# Patient Record
Sex: MALE | Race: WHITE | Employment: OTHER | URBAN - METROPOLITAN AREA
[De-identification: names, ages, dates, MRNs, and addresses within clinical notes are randomized per-mention and may not be internally consistent; named-entity substitution may affect disease eponyms.]

---

## 2017-05-22 LAB
AST SERPL-CCNC: 29 U/L (ref 8–48)
CHOLEST SERPL-MCNC: 122 MG/DL
CREAT SERPL-MCNC: 1 MG/DL (ref 0.8–1.3)
GLUCOSE SERPL-MCNC: 92 MG/DL (ref 70–100)
HBA1C MFR BLD: 5.7 % (ref 4–5.6)
HDLC SERPL-MCNC: 47 MG/DL
INR PPP: 1.1 (ref 0.9–1.1)
LDLC SERPL CALC-MCNC: 64 MG/DL
NONHDLC SERPL-MCNC: 75 MG/DL
POTASSIUM SERPL-SCNC: 4.3 MMOL/L (ref 3.6–5.2)
TRIGL SERPL-MCNC: 57 MG/DL
TSH SERPL-ACNC: 0.9 MIU/L (ref 0.3–4.2)

## 2017-05-23 ENCOUNTER — TRANSFERRED RECORDS (OUTPATIENT)
Dept: HEALTH INFORMATION MANAGEMENT | Facility: CLINIC | Age: 82
End: 2017-05-23

## 2017-06-12 ENCOUNTER — OFFICE VISIT (OUTPATIENT)
Dept: FAMILY MEDICINE | Facility: CLINIC | Age: 82
End: 2017-06-12
Payer: MEDICARE

## 2017-06-12 VITALS
WEIGHT: 175.4 LBS | TEMPERATURE: 98.3 F | OXYGEN SATURATION: 100 % | HEART RATE: 83 BPM | DIASTOLIC BLOOD PRESSURE: 68 MMHG | SYSTOLIC BLOOD PRESSURE: 106 MMHG

## 2017-06-12 DIAGNOSIS — R09.81 CONGESTION OF PARANASAL SINUS: Primary | ICD-10-CM

## 2017-06-12 PROCEDURE — 99202 OFFICE O/P NEW SF 15 MIN: CPT | Performed by: FAMILY MEDICINE

## 2017-06-12 RX ORDER — MULTIVIT WITH MINERALS/LUTEIN
1 TABLET ORAL DAILY
COMMUNITY

## 2017-06-12 RX ORDER — TAMSULOSIN HYDROCHLORIDE 0.4 MG/1
0.4 CAPSULE ORAL DAILY
COMMUNITY

## 2017-06-12 RX ORDER — SOTALOL HYDROCHLORIDE 80 MG/1
80 TABLET ORAL 2 TIMES DAILY
COMMUNITY
End: 2019-06-10

## 2017-06-12 RX ORDER — VIT C/E/ZN/COPPR/LUTEIN/ZEAXAN 60 MG-6 MG
1 CAPSULE ORAL DAILY
COMMUNITY
End: 2017-07-13

## 2017-06-12 RX ORDER — AMLODIPINE BESYLATE 2.5 MG/1
2.5 TABLET ORAL 2 TIMES DAILY
COMMUNITY

## 2017-06-12 RX ORDER — CLOPIDOGREL BISULFATE 75 MG/1
75 TABLET ORAL DAILY
COMMUNITY

## 2017-06-12 RX ORDER — MIRABEGRON 50 MG/1
50 TABLET, EXTENDED RELEASE ORAL DAILY
COMMUNITY
End: 2017-08-31

## 2017-06-12 RX ORDER — SULINDAC 200 MG/1
200 TABLET ORAL 2 TIMES DAILY
COMMUNITY

## 2017-06-12 RX ORDER — CALCIUM CARBONATE/VITAMIN D3 500-10/5ML
400 LIQUID (ML) ORAL 2 TIMES DAILY
COMMUNITY
End: 2019-08-13

## 2017-06-12 RX ORDER — GABAPENTIN 300 MG/1
300 CAPSULE ORAL DAILY
COMMUNITY
End: 2017-07-13

## 2017-06-12 NOTE — PROGRESS NOTES
SUBJECTIVE:                                                    Teddy Horner is a 81 year old male who presents to clinic today for the following health issues:      Acute Illness   Acute illness concerns: Sinus Prob  Onset: 2 months    Fever: no     Chills/Sweats: no    Headache (location?): no    Sinus Pressure:YES    Conjunctivitis:  no    Ear Pain: no    Rhinorrhea: YES    Congestion: YES    Sore Throat: no     Cough: no    Wheeze: no     Decreased Appetite: no     Nausea: no    Vomiting: no    Diarrhea:  no    Dysuria/Freq.: no    Fatigue/Achiness: no    Sick/Strep Exposure: no     Therapies Tried and outcome: Afrin, works          Problem list and histories reviewed & adjusted, as indicated.  Additional history: as documented    BP Readings from Last 3 Encounters:   06/12/17 106/68    Wt Readings from Last 3 Encounters:   06/12/17 79.6 kg (175 lb 6.4 oz)                    Reviewed and updated as needed this visit by clinical staff  Tobacco  Allergies  Med Hx  Surg Hx  Fam Hx  Soc Hx      Reviewed and updated as needed this visit by Provider  Tobacco  Allergies  Meds  Med Hx  Surg Hx  Fam Hx  Soc Hx        ROS:  Constitutional, HEENT, cardiovascular, pulmonary, gi and gu systems are negative, except as otherwise noted.    OBJECTIVE:                                                    /68 (BP Location: Right arm, Patient Position: Chair, Cuff Size: Adult Regular)  Pulse 83  Temp 98.3  F (36.8  C) (Oral)  Wt 79.6 kg (175 lb 6.4 oz)  SpO2 100%  There is no height or weight on file to calculate BMI.  GENERAL: healthy, alert and no distress  EYES: Eyes grossly normal to inspection, PERRL and conjunctivae and sclerae normal  HENT: normal cephalic/atraumatic, ear canals and TM's normal, nose and mouth without ulcers or lesions, nasal mucosa edematous , rhinorrhea clear, thick and postnasal, oropharynx clear and oral mucous membranes moist  NECK: no adenopathy, no asymmetry, masses, or scars and  thyroid normal to palpation  RESP: lungs clear to auscultation - no rales, rhonchi or wheezes  CV: regular rate and rhythm, normal S1 S2, no S3 or S4, no murmur, click or rub, no peripheral edema and peripheral pulses strong  MS: no gross musculoskeletal defects noted, no edema         ASSESSMENT/PLAN:                                                        1. Congestion of paranasal sinus  - fluticasone (FLONASE) 50 MCG/ACT spray; Spray 1-2 sprays into both nostrils daily  Dispense: 1 Bottle; Refill: 3    Patient Instructions   Begin flonase nasal spray daily.    Continue for 3-4 weeks after symptoms are resolved.        Whit Casiano MD  [facility]

## 2017-06-12 NOTE — NURSING NOTE
Chief Complaint   Patient presents with     Sinus Problem       Initial /68 (BP Location: Right arm, Patient Position: Chair, Cuff Size: Adult Regular)  Pulse 83  Temp 98.3  F (36.8  C) (Oral)  Wt 79.6 kg (175 lb 6.4 oz)  SpO2 100% There is no height or weight on file to calculate BMI.  Medication Reconciliation: complete       Tana Mann, CMA

## 2017-06-12 NOTE — MR AVS SNAPSHOT
"              After Visit Summary   6/12/2017    Teddy Horner    MRN: [id number]           Patient Information     Date Of Birth          1935        Visit Information        Provider Department      6/12/2017 12:00 PM Whit Casiano MD [facility]        Today's Diagnoses     Congestion of paranasal sinus    -  1      Care Instructions    Begin flonase nasal spray daily.    Continue for 3-4 weeks after symptoms are resolved.            Follow-ups after your visit        Follow-up notes from your care team     Return if symptoms worsen or fail to improve.      Who to contact     If you have questions or need follow up information about today's clinic visit or your schedule please contact [facility] directly at [phone].  Normal or non-critical lab and imaging results will be communicated to you by MyChart, letter or phone within 4 business days after the clinic has received the results. If you do not hear from us within 7 days, please contact the clinic through MyChart or phone. If you have a critical or abnormal lab result, we will notify you by phone as soon as possible.  Submit refill requests through [employer] or call your pharmacy and they will forward the refill request to us. Please allow 3 business days for your refill to be completed.          Additional Information About Your Visit        MyChart Information     [employer] lets you send messages to your doctor, view your test results, renew your prescriptions, schedule appointments and more. To sign up, go to [email or web address] . Click on \"Log in\" on the left side of the screen, which will take you to the Welcome page. Then click on \"Sign up Now\" on the right side of the page.     You will be asked to enter the access code listed below, as well as some personal information. Please follow the directions to create your username and password.     Your access code is: [email or web address]  Expires: 9/15/2017 10:51 PM   "   Your access code will  in 90 days. If you need help or a new code, please call your [facility] or [phone].        Care EveryWhere ID     This is your Care EveryWhere ID. This could be used by other organizations to access your [facility] medical records  [id number]        Your Vitals Were     Pulse Temperature Pulse Oximetry             83 98.3  F (36.8  C) (Oral) 100%          Blood Pressure from Last 3 Encounters:   17 106/68    Weight from Last 3 Encounters:   17 79.6 kg (175 lb 6.4 oz)              Today, you had the following     No orders found for display       Primary Care Provider    None Specified       No primary provider on file.        Thank you!     Thank you for choosing [facility]  for your care. Our goal is always to provide you with excellent care. Hearing back from our patients is one way we can continue to improve our services. Please take a few minutes to complete the written survey that you may receive in the mail after your visit with us. Thank you!             Your Updated Medication List - Protect others around you: Learn how to safely use, store and throw away your medicines at www.disposemymeds.org.          This list is accurate as of: 17 11:59 PM.  Always use your most recent med list.                   Brand Name Dispense Instructions for use    amLODIPine 2.5 MG tablet    NORVASC     Take 2.5 mg by mouth 2 times daily       aspirin 81 MG tablet      Take 81 mg by mouth daily       atorvastatin 10 MG tablet    LIPITOR    30 tablet    Take 0.5 tablets (5 mg) by mouth daily       BETAPACE 80 MG tablet   Generic drug:  sotalol      Take 80 mg by mouth 2 times daily       * CENTRUM SILVER per tablet      Take 1 tablet by mouth daily       * multivitamin  with lutein Caps per capsule      Take 1 capsule by mouth daily       * OCUVITE PO          FLOMAX 0.4 MG capsule   Generic drug:  tamsulosin      Take 0.4 mg by mouth daily        fluticasone 50 MCG/ACT spray    FLONASE    1 Bottle    Spray 1-2 sprays into both nostrils daily       levothyroxine 100 MCG tablet    SYNTHROID/LEVOTHROID    90 tablet    Take 1 tablet (100 mcg) by mouth daily       magnesium oxide 400 MG Caps      Take 400 mg by mouth 2 times daily       mirabegron 50 MG 24 hr tablet    MYRBETRIQ     Take 50 mg by mouth daily       NEURONTIN 300 MG capsule   Generic drug:  gabapentin      Take 300 mg by mouth daily       PANTOPRAZOLE SODIUM PO      Take 40 mg by mouth every evening       PLAVIX 75 MG tablet   Generic drug:  clopidogrel      Take 75 mg by mouth daily       riboflavin 100 MG Caps      Take 100 mg by mouth daily       sulindac 200 MG tablet    CLINORIL     Take 200 mg by mouth 2 times daily       * Notice:  This list has 3 medication(s) that are the same as other medications prescribed for you. Read the directions carefully, and ask your doctor or other care provider to review them with you.

## 2017-06-17 PROBLEM — G43.109 OCULAR MIGRAINE: Status: ACTIVE | Noted: 2017-06-17

## 2017-06-17 PROBLEM — I25.810 CORONARY ARTERY DISEASE INVOLVING CORONARY BYPASS GRAFT OF NATIVE HEART WITHOUT ANGINA PECTORIS: Status: ACTIVE | Noted: 2017-06-17

## 2017-06-17 PROBLEM — Z86.73 HISTORY OF TIA (TRANSIENT ISCHEMIC ATTACK) AND STROKE: Status: ACTIVE | Noted: 2017-06-17

## 2017-06-17 PROBLEM — E03.9 ACQUIRED HYPOTHYROIDISM: Status: ACTIVE | Noted: 2017-06-17

## 2017-06-17 RX ORDER — LEVOTHYROXINE SODIUM 100 UG/1
100 TABLET ORAL DAILY
Qty: 90 TABLET | Refills: 1 | COMMUNITY
Start: 2017-06-17

## 2017-06-17 RX ORDER — ATORVASTATIN CALCIUM 10 MG/1
5 TABLET, FILM COATED ORAL DAILY
Qty: 30 TABLET | Refills: 1 | COMMUNITY
Start: 2017-06-17

## 2017-06-17 RX ORDER — FLUTICASONE PROPIONATE 50 MCG
1-2 SPRAY, SUSPENSION (ML) NASAL DAILY
Qty: 1 BOTTLE | Refills: 3 | COMMUNITY
Start: 2017-06-17 | End: 2017-11-28

## 2017-07-13 ENCOUNTER — OFFICE VISIT (OUTPATIENT)
Dept: FAMILY MEDICINE | Facility: CLINIC | Age: 82
End: 2017-07-13
Payer: MEDICARE

## 2017-07-13 ENCOUNTER — RADIANT APPOINTMENT (OUTPATIENT)
Dept: GENERAL RADIOLOGY | Facility: CLINIC | Age: 82
End: 2017-07-13
Attending: FAMILY MEDICINE
Payer: MEDICARE

## 2017-07-13 VITALS
HEART RATE: 70 BPM | WEIGHT: 176.5 LBS | TEMPERATURE: 97.7 F | OXYGEN SATURATION: 98 % | DIASTOLIC BLOOD PRESSURE: 82 MMHG | SYSTOLIC BLOOD PRESSURE: 128 MMHG

## 2017-07-13 DIAGNOSIS — I25.810 CORONARY ARTERY DISEASE INVOLVING CORONARY BYPASS GRAFT OF NATIVE HEART WITHOUT ANGINA PECTORIS: ICD-10-CM

## 2017-07-13 DIAGNOSIS — M79.2 NEUROPATHIC PAIN, ARM: ICD-10-CM

## 2017-07-13 DIAGNOSIS — M50.30 DDD (DEGENERATIVE DISC DISEASE), CERVICAL: ICD-10-CM

## 2017-07-13 DIAGNOSIS — R20.2 BILATERAL ARM NUMBNESS AND TINGLING WHILE SLEEPING: Primary | ICD-10-CM

## 2017-07-13 DIAGNOSIS — R20.0 BILATERAL ARM NUMBNESS AND TINGLING WHILE SLEEPING: Primary | ICD-10-CM

## 2017-07-13 DIAGNOSIS — R20.2 BILATERAL ARM NUMBNESS AND TINGLING WHILE SLEEPING: ICD-10-CM

## 2017-07-13 DIAGNOSIS — J01.90 ACUTE SINUSITIS WITH SYMPTOMS > 10 DAYS: ICD-10-CM

## 2017-07-13 DIAGNOSIS — R20.0 BILATERAL ARM NUMBNESS AND TINGLING WHILE SLEEPING: ICD-10-CM

## 2017-07-13 PROCEDURE — 99215 OFFICE O/P EST HI 40 MIN: CPT | Performed by: FAMILY MEDICINE

## 2017-07-13 PROCEDURE — 72040 X-RAY EXAM NECK SPINE 2-3 VW: CPT

## 2017-07-13 RX ORDER — AMLODIPINE BESYLATE 2.5 MG/1
2.5 TABLET ORAL 2 TIMES DAILY
Qty: 30 TABLET | Status: CANCELLED | OUTPATIENT
Start: 2017-07-13

## 2017-07-13 RX ORDER — GABAPENTIN 100 MG/1
100 CAPSULE ORAL AT BEDTIME
Qty: 30 CAPSULE | Refills: 0 | Status: SHIPPED | OUTPATIENT
Start: 2017-07-13 | End: 2019-06-10

## 2017-07-13 RX ORDER — AMOXICILLIN 875 MG
875 TABLET ORAL 2 TIMES DAILY
Qty: 20 TABLET | Refills: 0 | Status: SHIPPED | OUTPATIENT
Start: 2017-07-13 | End: 2017-08-31

## 2017-07-13 NOTE — NURSING NOTE
Chief Complaint   Patient presents with     Tingling     bilateral hands       Initial /82 (BP Location: Left arm, Patient Position: Sitting, Cuff Size: Adult Regular)  Pulse 70  Temp 97.7  F (36.5  C) (Oral)  Wt 80.1 kg (176 lb 8 oz)  SpO2 98% There is no height or weight on file to calculate BMI.  Medication Reconciliation: complete       Tana Mann, CMA

## 2017-07-13 NOTE — PROGRESS NOTES
"  SUBJECTIVE:                                                    Teddy Horner is a 81 year old male who presents to clinic today for the following health issues:      Hand tingling    Onset: 3 weeks    Description:   Location: bilateral hands  Character: \"pins and needle\" feeling    Intensity: severity comes and goes    Progression of Symptoms: same    Accompanying Signs & Symptoms:  Other symptoms: radiation up arm to wrist, sometimes to the shoulder and tingling varies in intensity    History:   Previous similar pain: no       Precipitating factors:   Trauma or overuse: no     Alleviating factors:  Improved by: nothing    Therapies Tried and outcome: None  Initially more sporadic, now relatively constant at night.  Varies position at night.          RESPIRATORY SYMPTOMS      Duration: 2-3 months    Description  nasal congestion, rhinorrhea, facial pain/pressure, cough and headache    Severity: moderate    Accompanying signs and symptoms: None    History (predisposing factors):  none    Precipitating or alleviating factors: None    Therapies tried and outcome:  rest and fluids antihistamine guaifenesin    Vascular Disease Follow-up:  Coronary Artery Disease (CAD)      Chest pain or pressure, left side neck or arm pain: No    Shortness of breath/increased sweats/nausea with exertion: No    Pain in calves walking 1-2 blocks: No    Worsened or new symptoms since last visit: No    Nitroglycerin use: no    Daily aspirin use: Yes      Problem list and histories reviewed & adjusted, as indicated.  Additional history: as documented    BP Readings from Last 3 Encounters:   07/13/17 128/82   06/12/17 106/68    Wt Readings from Last 3 Encounters:   07/13/17 80.1 kg (176 lb 8 oz)   06/12/17 79.6 kg (175 lb 6.4 oz)                    Reviewed and updated as needed this visit by clinical staff  Tobacco  Allergies  Meds  Med Hx  Surg Hx  Fam Hx  Soc Hx      Reviewed and updated as needed this visit by Provider  Tobacco  " Allergies  Meds  Med Hx  Surg Hx  Fam Hx  Soc Hx        ROS:  Constitutional, HEENT, cardiovascular, pulmonary, gi and gu systems are negative, except as otherwise noted.    OBJECTIVE:     /82 (BP Location: Left arm, Patient Position: Sitting, Cuff Size: Adult Regular)  Pulse 70  Temp 97.7  F (36.5  C) (Oral)  Wt 80.1 kg (176 lb 8 oz)  SpO2 98%  There is no height or weight on file to calculate BMI.  GENERAL: alert and no distress  HENT: normal cephalic/atraumatic, ear canals and TM's normal, nose and mouth without ulcers or lesions, nasal mucosa edematous , rhinorrhea yellow, green, thick and postnasal, oropharynx clear, oral mucous membranes moist and sinuses: maxillary, frontal tenderness on both sides  NECK: no adenopathy, no asymmetry, masses, or scars and thyroid normal to palpation  RESP: lungs clear to auscultation - no rales, rhonchi or wheezes  CV: regular rate and rhythm, normal S1 S2, no S3 or S4, no murmur, click or rub, no peripheral edema and peripheral pulses strong  ABDOMEN: soft, nontender, no hepatosplenomegaly, no masses and bowel sounds normal  MS: extremities normal- no gross deformities noted  NEURO: Normal strength and tone, sensory exam grossly normal, mentation intact, cranial nerves 2-12 intact, DTR's normal and symmetric throughout, gait normal including heel/toe/tandem walking, Romberg normal and complaints of pain positional with numbness/tingling in the arms bilaterally  PSYCH: mentation appears normal, affect normal/bright    Diagnostic Test Results:  Xray - DDD C6-7 with fusion of C5-6 noted.      ASSESSMENT/PLAN:         1. Bilateral arm numbness and tingling while sleeping  Reviewed labs done in May from [facility] - normal TSH, blood sugar. Slightly elevated B12.    Likely related to DDD  - XR Cervical Spine 2/3 Views; Future  - gabapentin (NEURONTIN) 100 MG capsule; Take 1 capsule (100 mg) by mouth At Bedtime  Dispense: 30 capsule; Refill: 0  - MR Cervical Spine w/o  Contrast; Future  - patient prefers to have completed at [place] when he is there.  Order placed and printed.      2. DDD (degenerative disc disease), cervical  Trial of gabapentin for pain/numbness - adjust dose as needed.  Has taken medication in past prior to previous neck surgery.   - gabapentin (NEURONTIN) 100 MG capsule; Take 1 capsule (100 mg) by mouth At Bedtime  Dispense: 30 capsule; Refill: 0  - MR Cervical Spine w/o Contrast; Future    3. Neuropathic pain, arm  - gabapentin (NEURONTIN) 100 MG capsule; Take 1 capsule (100 mg) by mouth At Bedtime  Dispense: 30 capsule; Refill: 0  - MR Cervical Spine w/o Contrast; Future    4. Acute sinusitis with symptoms > 10 days  Symptomatic care as described.   - amoxicillin (AMOXIL) 875 MG tablet; Take 1 tablet (875 mg) by mouth 2 times daily  Dispense: 20 tablet; Refill: 0    5. Coronary artery disease involving coronary bypass graft of native heart without angina pectoris  Controlled.  Follow up with [facility] next month as planned.  Discrepancy in dosing between pharmacy and [facility] sites.  Patient instructed to verify dosing with primary cardiology and notify me if refill needed.    Patient Instructions   I would recommend you verify with your primary cardiologist how they want you to take the Amlodipine since the dosing is different on the records from what the pharmacy reports.      Xray indicates degenerative disc disease for C5-6, C6-7.  I would recommend MRI of the neck for evaluation because the arm symptoms are likely related.  I would recommend you add Neurontin 100 mg at night for the arm pain.  If helpful but continued symptoms we can adjust the dose.      Begin amoxicillin.  Continue the flonase.      Whit Casiano MD  [facility]        Total time:  45 min,  Counseling time:  Greater than 50% of total time with reference to the above noted symptoms.

## 2017-07-13 NOTE — MR AVS SNAPSHOT
After Visit Summary   7/13/2017    Teddy Horner    MRN: [id number]           Patient Information     Date Of Birth          1935        Visit Information        Provider Department      7/13/2017 1:40 PM Whit Casiano MD [facility]        Today's Diagnoses     Bilateral arm numbness and tingling while sleeping    -  1    Acute sinusitis with symptoms > 10 days        DDD (degenerative disc disease), cervical        Neuropathic pain, arm          Care Instructions    I would recommend you verify with your primary cardiologist how they want you to take the Amlodipine since the dosing is different on the records from what the pharmacy reports.      Xray indicates degenerative disc disease for C5-6, C6-7.  I would recommend MRI of the neck for evaluation because the arm symptoms are likely related.  I would recommend you add Neurontin 100 mg at night for the arm pain.  If helpful but continued symptoms we can adjust the dose.      Begin amoxicillin.  Continue the flonase.          Follow-ups after your visit        Future tests that were ordered for you today     Open Future Orders        Priority Expected Expires Ordered    MR Cervical Spine w/o Contrast Routine  7/13/2018 7/13/2017            Who to contact     If you have questions or need follow up information about today's clinic visit or your schedule please contact [facility] directly at [phone].  Normal or non-critical lab and imaging results will be communicated to you by MyChart, letter or phone within 4 business days after the clinic has received the results. If you do not hear from us within 7 days, please contact the clinic through MyChart or phone. If you have a critical or abnormal lab result, we will notify you by phone as soon as possible.  Submit refill requests through [email or web address] or call your pharmacy and they will forward the refill request to us. Please allow 3 business days for your refill  "to be completed.          Additional Information About Your Visit        [employer] Information     [employer] lets you send messages to your doctor, view your test results, renew your prescriptions, schedule appointments and more. To sign up, go to [email or web address] . Click on \"Log in\" on the left side of the screen, which will take you to the Welcome page. Then click on \"Sign up Now\" on the right side of the page.     You will be asked to enter the access code listed below, as well as some personal information. Please follow the directions to create your username and password.     Your access code is: [email or web address]  Expires: 9/15/2017 10:51 PM     Your access code will  in 90 days. If you need help or a new code, please call your [facility] clinic or [phone].        Care EveryWhere ID     This is your Care EveryWhere ID. This could be used by other organizations to access your [facility] medical records  [id number]        Your Vitals Were     Pulse Temperature Pulse Oximetry             70 97.7  F (36.5  C) (Oral) 98%          Blood Pressure from Last 3 Encounters:   17 128/82   17 106/68    Weight from Last 3 Encounters:   17 80.1 kg (176 lb 8 oz)   17 79.6 kg (175 lb 6.4 oz)                 Today's Medication Changes          These changes are accurate as of: 17  3:43 PM.  If you have any questions, ask your nurse or doctor.               Start taking these medicines.        Dose/Directions    amoxicillin 875 MG tablet   Commonly known as:  AMOXIL   Used for:  Acute sinusitis with symptoms > 10 days   Started by:  Whit Casiano MD        Dose:  875 mg   Take 1 tablet (875 mg) by mouth 2 times daily   Quantity:  20 tablet   Refills:  0       gabapentin 100 MG capsule   Commonly known as:  NEURONTIN   Used for:  Bilateral arm numbness and tingling while sleeping, DDD (degenerative disc disease), cervical, Neuropathic pain, arm   Started by:  Whit Casiano MD        " Dose:  100 mg   Take 1 capsule (100 mg) by mouth At Bedtime   Quantity:  30 capsule   Refills:  0            Where to get your medicines      These medications were sent to [facility] [address] [facility] [address] [place] [address]  [address] [address] [address]     Phone:  [phone]     amoxicillin 875 MG tablet    gabapentin 100 MG capsule                Primary Care Provider    None Specified       No primary provider on file.        Equal Access to Services     [facility] : Hadii emely tim hadasho Soomaali, waaxda luqadaha, qaybta kaalmada adeegyada, waxay kurtin haybozena roach . So [facility] [phone].    ATENCIÓN: Si habla espdania, tiene a knutson disposición servicios gratuitos de asistencia lingüística. Victoriano al [phone].    We comply with applicable federal civil rights laws and [place] laws. We do not discriminate on the basis of race, color, national origin, age, disability sex, sexual orientation or gender identity.            Thank you!     Thank you for choosing [facility]  for your care. Our goal is always to provide you with excellent care. Hearing back from our patients is one way we can continue to improve our services. Please take a few minutes to complete the written survey that you may receive in the mail after your visit with us. Thank you!             Your Updated Medication List - Protect others around you: Learn how to safely use, store and throw away your medicines at www.disposemymeds.org.          This list is accurate as of: 7/13/17  3:43 PM.  Always use your most recent med list.                   Brand Name Dispense Instructions for use Diagnosis    amLODIPine 2.5 MG tablet    NORVASC     Take 2.5 mg by mouth 2 times daily        amoxicillin 875 MG tablet    AMOXIL    20 tablet    Take 1 tablet (875 mg) by mouth 2 times daily    Acute sinusitis with symptoms > 10 days       aspirin 81 MG tablet      Take 81 mg by mouth daily         atorvastatin 10 MG tablet    LIPITOR    30 tablet    Take 0.5 tablets (5 mg) by mouth daily        BETAPACE 80 MG tablet   Generic drug:  sotalol      Take 80 mg by mouth 2 times daily        * CENTRUM SILVER per tablet      Take 1 tablet by mouth daily        * OCUVITE PO           FLOMAX 0.4 MG capsule   Generic drug:  tamsulosin      Take 0.4 mg by mouth daily        fluticasone 50 MCG/ACT spray    FLONASE    1 Bottle    Spray 1-2 sprays into both nostrils daily    Congestion of paranasal sinus       gabapentin 100 MG capsule    NEURONTIN    30 capsule    Take 1 capsule (100 mg) by mouth At Bedtime    Bilateral arm numbness and tingling while sleeping, DDD (degenerative disc disease), cervical, Neuropathic pain, arm       levothyroxine 100 MCG tablet    SYNTHROID/LEVOTHROID    90 tablet    Take 1 tablet (100 mcg) by mouth daily        magnesium oxide 400 MG Caps      Take 400 mg by mouth 2 times daily        mirabegron 50 MG 24 hr tablet    MYRBETRIQ     Take 50 mg by mouth daily        PANTOPRAZOLE SODIUM PO      Take 40 mg by mouth every evening        PLAVIX 75 MG tablet   Generic drug:  clopidogrel      Take 75 mg by mouth daily        sulindac 200 MG tablet    CLINORIL     Take 200 mg by mouth 2 times daily        * Notice:  This list has 2 medication(s) that are the same as other medications prescribed for you. Read the directions carefully, and ask your doctor or other care provider to review them with you.

## 2017-07-13 NOTE — PATIENT INSTRUCTIONS
I would recommend you verify with your primary cardiologist how they want you to take the Amlodipine since the dosing is different on the records from what the pharmacy reports.      Xray indicates degenerative disc disease for C5-6, C6-7.  I would recommend MRI of the neck for evaluation because the arm symptoms are likely related.  I would recommend you add Neurontin 100 mg at night for the arm pain.  If helpful but continued symptoms we can adjust the dose.      Begin amoxicillin.  Continue the flonase.

## 2017-07-13 NOTE — LETTER
[address] [address]  [phone]      July 17, 2017      Teddy Horner  [address]  [facility] [address]      Please print letter and attach results.  PSK         Attached you will find a copy of your recent xray report.   Your final xray reports notes the fusion of the 2 bones we saw in the xray yesterday at your appointment.  This is undoubtedly due to your prior surgery.   The additional arthritis changes are noted.  The MRI would be helpful at evaluating the spinal cord with nerves in the area related to the numbness symptoms you are having.   Please call or MyChart message me if you have any questions.       Sincerely,         Whit Casiano MD

## 2017-07-14 NOTE — PROGRESS NOTES
Please print letter and attach results.  PSK        Attached you will find a copy of your recent xray report.  Your final xray reports notes the fusion of the 2 bones we saw in the xray yesterday at your appointment.  This is undoubtedly due to your prior surgery.  The additional arthritis changes are noted.  The MRI would be helpful at evaluating the spinal cord with nerves in the area related to the numbness symptoms you are having.  Please call or MyChart message me if you have any questions.    Sincerely,         Whit Casiano MD

## 2017-07-14 NOTE — TELEPHONE ENCOUNTER
amLODIPine (NORVASC) 2.5 MG tablet      Last Written Prescription Date:  06/17/17  Last Fill Quantity: n/a,   # refills: n/a  Last Office Visit with [facility], P or [facility] prescribing provider: 7/13/17 Dr. Casiano  Future Office visit:       Routing refill request to provider for review/approval because:  Drug not active on patient's medication list  Medication is reported/historical

## 2017-07-17 NOTE — TELEPHONE ENCOUNTER
"The number below is not active.  Pharmacy has these numbers on file [phone] and [phone].  Pt answered the first number listed.     He is getting amlodipine refilled by his cardiologist.  He was advised for first 5 days to take amlodipine 2.5 mg BID.    Pt said: \"my blood pressure dropped dramatically; in the morning it is around 117/67\"  When asked if he feels dizzy, he said \"No\" but then he said that he is dizzy at all times because of other medications.    Dana Kramer RN    "

## 2017-07-17 NOTE — TELEPHONE ENCOUNTER
Call spouse re:  norvasc dose - just to verify, did they contact his primary cardiologist? - he is taking 2.5 mg in AM and repeating dose in pm only if BP >160/100    PSK

## 2017-07-18 ENCOUNTER — TELEPHONE (OUTPATIENT)
Dept: FAMILY MEDICINE | Facility: CLINIC | Age: 82
End: 2017-07-18

## 2017-07-18 DIAGNOSIS — R20.2 BILATERAL ARM NUMBNESS AND TINGLING WHILE SLEEPING: ICD-10-CM

## 2017-07-18 DIAGNOSIS — M50.30 DDD (DEGENERATIVE DISC DISEASE), CERVICAL: Primary | ICD-10-CM

## 2017-07-18 DIAGNOSIS — M79.2 NEUROPATHIC PAIN, ARM: ICD-10-CM

## 2017-07-18 DIAGNOSIS — R20.0 BILATERAL ARM NUMBNESS AND TINGLING WHILE SLEEPING: ICD-10-CM

## 2017-07-18 NOTE — TELEPHONE ENCOUNTER
"Reason for Call:  Other call back    Detailed comments:  Teddy ross to inform you that per is \"electric\" Doctor he does not recommend that Teddy get and MRI done unless it is out at the [facility].  Normal MRI machines can melt the coils and what not he says.  Teddy does have an MRI scheduled in September for other reasons at the [facility]. Not sure if he would need to get another one per your orders if so could you send the order to [place] for him.       Phone Number Patient can be reached at: Cell number on file:    Telephone Information:   Mobile [phone]       Best Time: Any    Can we leave a detailed message on this number? YES    Call taken on 7/18/2017 at 2:24 PM by Ganga Holder    [facility]   "

## 2017-07-28 ENCOUNTER — TELEPHONE (OUTPATIENT)
Dept: FAMILY MEDICINE | Facility: CLINIC | Age: 82
End: 2017-07-28

## 2017-07-28 NOTE — TELEPHONE ENCOUNTER
Patient contacted.  Will have MRI at [place] on August 14th and them will see spine specialist.  Working to schedule appt with [facility] after MRI is done. This is a FYI.

## 2017-07-28 NOTE — TELEPHONE ENCOUNTER
Reason for Call:  Other appointment    Detailed comments: Pt would like a call alee for he had to cancel his MRI for he has an implant and does not want to destroy the leads in his ICD.  He also has further and has questions regarding his future MRI apts that he would like to discuss further as well.    Phone Number Patient can be reached at: Cell number on file:    Telephone Information:   Mobile [phone]       Best Time: anytime    Can we leave a detailed message on this number? YES    Call taken on 7/28/2017 at 10:01 AM by Nick Krishna

## 2017-07-28 NOTE — TELEPHONE ENCOUNTER
Original plan was for him to have the MRI through [place] at that appointment he already had scheduled.  Timing noted.    HAKEEM

## 2017-08-31 ENCOUNTER — OFFICE VISIT (OUTPATIENT)
Dept: FAMILY MEDICINE | Facility: CLINIC | Age: 82
End: 2017-08-31
Payer: MEDICARE

## 2017-08-31 ENCOUNTER — TELEPHONE (OUTPATIENT)
Dept: FAMILY MEDICINE | Facility: CLINIC | Age: 82
End: 2017-08-31

## 2017-08-31 VITALS
WEIGHT: 182 LBS | HEIGHT: 68 IN | SYSTOLIC BLOOD PRESSURE: 92 MMHG | BODY MASS INDEX: 27.58 KG/M2 | HEART RATE: 81 BPM | TEMPERATURE: 96.9 F | OXYGEN SATURATION: 98 % | DIASTOLIC BLOOD PRESSURE: 64 MMHG

## 2017-08-31 DIAGNOSIS — H01.02A SQUAMOUS BLEPHARITIS OF UPPER AND LOWER EYELIDS OF BOTH EYES: ICD-10-CM

## 2017-08-31 DIAGNOSIS — H01.02B SQUAMOUS BLEPHARITIS OF UPPER AND LOWER EYELIDS OF BOTH EYES: ICD-10-CM

## 2017-08-31 DIAGNOSIS — H10.33 ACUTE BACTERIAL CONJUNCTIVITIS OF BOTH EYES: Primary | ICD-10-CM

## 2017-08-31 PROBLEM — Z71.89 ADVANCED DIRECTIVES, COUNSELING/DISCUSSION: Status: ACTIVE | Noted: 2017-08-31

## 2017-08-31 PROCEDURE — 99213 OFFICE O/P EST LOW 20 MIN: CPT | Performed by: FAMILY MEDICINE

## 2017-08-31 RX ORDER — TOBRAMYCIN 3 MG/ML
1 SOLUTION/ DROPS OPHTHALMIC EVERY 4 HOURS
Qty: 1 BOTTLE | Refills: 0 | Status: SHIPPED | OUTPATIENT
Start: 2017-08-31 | End: 2017-09-11

## 2017-08-31 NOTE — PROGRESS NOTES
"  SUBJECTIVE:                                                    Teddy Horner is a 81 year old male who presents to clinic today for the following health issues:    Eye(s) Problem  Onset: x 1 week    Description:   Location: bilateral  Pain: no   Redness: YES    Accompanying Signs & Symptoms:  Discharge/mattering: YES  L>R  Swelling: YES  Visual changes: no  Fever: no  Nasal Congestion: YES  Bothered by bright lights: no    History:   Trauma: no   Foreign body exposure: no    Precipitating factors:   Wearing contacts: no    Alleviating factors:  Improved by: none    Therapies Tried and outcome: none          Problem list and histories reviewed & adjusted, as indicated.  Additional history: as documented    BP Readings from Last 3 Encounters:   08/31/17 92/64   07/13/17 128/82   06/12/17 106/68    Wt Readings from Last 3 Encounters:   08/31/17 82.6 kg (182 lb)   07/13/17 80.1 kg (176 lb 8 oz)   06/12/17 79.6 kg (175 lb 6.4 oz)                      ROS:  Constitutional, HEENT, cardiovascular, pulmonary, gi and gu systems are negative, except as otherwise noted.      OBJECTIVE:   BP 92/64  Pulse 81  Temp 96.9  F (36.1  C) (Oral)  Ht 1.727 m (5' 8\")  Wt 82.6 kg (182 lb)  SpO2 98%  BMI 27.67 kg/m2  Body mass index is 27.67 kg/(m^2).  GENERAL: alert, no distress and elderly  EYES: eyelids- erythema with scaling noted and conjunctiva/corneas- conjunctival injection OU and yellow colored discharge present bilateral  NECK: no adenopathy, no asymmetry, masses, or scars and thyroid normal to palpation  RESP: lungs clear to auscultation - no rales, rhonchi or wheezes  CV: regular rate and rhythm, normal S1 S2, no S3 or S4, no murmur, click or rub, no peripheral edema and peripheral pulses strong  MS: no gross musculoskeletal defects noted, no edema        ASSESSMENT/PLAN:             1. Acute bacterial conjunctivitis of both eyes  - tobramycin (TOBREX) 0.3 % ophthalmic solution; Apply 1 drop to eye every 4 hours for 7 " days  Dispense: 1 Bottle; Refill: 0    2. Squamous blepharitis of upper and lower eyelids of both eyes  See instructions.      Patient Instructions   Begin eye drops both eyes every 4 hour while awake for 7 days.    Baby shampoo eye washes twice a day recommended.        Whit Casiano MD  [facility]

## 2017-08-31 NOTE — MR AVS SNAPSHOT
"              After Visit Summary   2017    Teddy Horner    MRN: [id number]           Patient Information     Date Of Birth          1935        Visit Information        Provider Department      2017 8:40 AM Whit Casiano MD [facility]        Today's Diagnoses     Acute bacterial conjunctivitis of both eyes    -  1      Care Instructions    Begin eye drops both eyes every 4 hour while awake for 7 days.    Baby shampoo eye washes twice a day recommended.            Follow-ups after your visit        Who to contact     If you have questions or need follow up information about today's clinic visit or your schedule please contact [facility] directly at [phone].  Normal or non-critical lab and imaging results will be communicated to you by MyChart, letter or phone within 4 business days after the clinic has received the results. If you do not hear from us within 7 days, please contact the clinic through MyChart or phone. If you have a critical or abnormal lab result, we will notify you by phone as soon as possible.  Submit refill requests through [employer] or call your pharmacy and they will forward the refill request to us. Please allow 3 business days for your refill to be completed.          Additional Information About Your Visit        MyChart Information     [employer] lets you send messages to your doctor, view your test results, renew your prescriptions, schedule appointments and more. To sign up, go to [email or web address] . Click on \"Log in\" on the left side of the screen, which will take you to the Welcome page. Then click on \"Sign up Now\" on the right side of the page.     You will be asked to enter the access code listed below, as well as some personal information. Please follow the directions to create your username and password.     Your access code is: [email or web address]  Expires: 9/15/2017 10:51 PM     Your access code will  in 90 days. If you need " "help or a new code, please call your [facility] clinic or [phone].        Care EveryWhere ID     This is your Care EveryWhere ID. This could be used by other organizations to access your [facility] medical records  [id number]        Your Vitals Were     Pulse Temperature Height Pulse Oximetry BMI (Body Mass Index)       81 96.9  F (36.1  C) (Oral) 1.727 m (5' 8\") 98% 27.67 kg/m2        Blood Pressure from Last 3 Encounters:   08/31/17 92/64   07/13/17 128/82   06/12/17 106/68    Weight from Last 3 Encounters:   08/31/17 82.6 kg (182 lb)   07/13/17 80.1 kg (176 lb 8 oz)   06/12/17 79.6 kg (175 lb 6.4 oz)              Today, you had the following     No orders found for display         Today's Medication Changes          These changes are accurate as of: 8/31/17  9:14 AM.  If you have any questions, ask your nurse or doctor.               Start taking these medicines.        Dose/Directions    tobramycin 0.3 % ophthalmic solution   Commonly known as:  TOBREX   Used for:  Acute bacterial conjunctivitis of both eyes   Started by:  Whit Casiano MD        Dose:  1 drop   Apply 1 drop to eye every 4 hours for 7 days   Quantity:  1 Bottle   Refills:  0            Where to get your medicines      These medications were sent to [facility] [address] [place] [address] [address]  [address] [facility] [place] [address]     Phone:  [phone]     tobramycin 0.3 % ophthalmic solution                Primary Care Provider    None Specified       No primary provider on file.        Equal Access to Services     [facility] Center[facility]: Hadcharissa Bangura, wakita luqadaha, qaybraman humphreysaltennille mejia. So [facility] [phone].    ATENCIÓN: Si habla español, tiene a knutosn disposición servicios gratuitos de asistencia lingüística. Llame al [phone].    We comply with applicable federal civil rights laws and [place] laws. We do not discriminate on the basis of race, " color, national origin, age, disability sex, sexual orientation or gender identity.            Thank you!     Thank you for choosing [facility]  for your care. Our goal is always to provide you with excellent care. Hearing back from our patients is one way we can continue to improve our services. Please take a few minutes to complete the written survey that you may receive in the mail after your visit with us. Thank you!             Your Updated Medication List - Protect others around you: Learn how to safely use, store and throw away your medicines at www.disposemymeds.org.          This list is accurate as of: 8/31/17  9:14 AM.  Always use your most recent med list.                   Brand Name Dispense Instructions for use Diagnosis    amLODIPine 2.5 MG tablet    NORVASC     Take 2.5 mg by mouth 2 times daily        aspirin 81 MG tablet      Take 81 mg by mouth daily        atorvastatin 10 MG tablet    LIPITOR    30 tablet    Take 0.5 tablets (5 mg) by mouth daily        BETAPACE 80 MG tablet   Generic drug:  sotalol      Take 80 mg by mouth 2 times daily        * CENTRUM SILVER per tablet      Take 1 tablet by mouth daily        * OCUVITE PO           FLOMAX 0.4 MG capsule   Generic drug:  tamsulosin      Take 0.4 mg by mouth daily        fluticasone 50 MCG/ACT spray    FLONASE    1 Bottle    Spray 1-2 sprays into both nostrils daily    Congestion of paranasal sinus       gabapentin 100 MG capsule    NEURONTIN    30 capsule    Take 1 capsule (100 mg) by mouth At Bedtime    Bilateral arm numbness and tingling while sleeping, DDD (degenerative disc disease), cervical, Neuropathic pain, arm       levothyroxine 100 MCG tablet    SYNTHROID/LEVOTHROID    90 tablet    Take 1 tablet (100 mcg) by mouth daily        magnesium oxide 400 MG Caps      Take 400 mg by mouth 2 times daily        PANTOPRAZOLE SODIUM PO      Take 40 mg by mouth every evening        PLAVIX 75 MG tablet   Generic drug:  clopidogrel       Take 75 mg by mouth daily        sulindac 200 MG tablet    CLINORIL     Take 200 mg by mouth 2 times daily        tobramycin 0.3 % ophthalmic solution    TOBREX    1 Bottle    Apply 1 drop to eye every 4 hours for 7 days    Acute bacterial conjunctivitis of both eyes       * Notice:  This list has 2 medication(s) that are the same as other medications prescribed for you. Read the directions carefully, and ask your doctor or other care provider to review them with you.

## 2017-08-31 NOTE — TELEPHONE ENCOUNTER
Reason for Call:  Other prescription    Detailed comments: Pt calling for he checked with [facility] and they have not received the tobramycin Rx and would like for Dr. Casiano to resend the Rx.  He would like a call back to confirm Rx was sent.    Phone Number Patient can be reached at: Home number on file [phone] (home)    Best Time: anytime    Can we leave a detailed message on this number? YES    Call taken on 8/31/2017 at 9:48 AM by Nick Krishna

## 2017-08-31 NOTE — NURSING NOTE
"Chief Complaint   Patient presents with     Conjunctivitis     possible pink eye per pt x 1 week in both eyes       Initial BP 92/64  Pulse 81  Temp 96.9  F (36.1  C) (Oral)  Ht 1.727 m (5' 8\")  Wt 82.6 kg (182 lb)  SpO2 98%  BMI 27.67 kg/m2 Estimated body mass index is 27.67 kg/(m^2) as calculated from the following:    Height as of this encounter: 1.727 m (5' 8\").    Weight as of this encounter: 82.6 kg (182 lb).  Medication Reconciliation: complete      Tricia Clarke MA    "

## 2017-08-31 NOTE — TELEPHONE ENCOUNTER
tobramycin (TOBREX) 0.3 % ophthalmic solution 1 Bottle 0 8/31/2017 9/7/2017 --   Sig: Apply 1 drop to eye every 4 hours for 7 days   Class: E-Prescribe   Route: Ophthalmic   Order: [id number]   E-Prescribing Status: Receipt confirmed by pharmacy (8/31/2017  9:13 AM CDT)   Printout Tracking   External Result Report   Pharmacy   [address]     Inform patient it was sent to [facility]. Explain how patient can get [facility] to call [facility] to transfer Rx if so desired.   Lynette Grimes RN

## 2017-08-31 NOTE — PATIENT INSTRUCTIONS
Begin eye drops both eyes every 4 hour while awake for 7 days.    Baby shampoo eye washes twice a day recommended.

## 2017-09-01 PROBLEM — H01.02B SQUAMOUS BLEPHARITIS OF UPPER AND LOWER EYELIDS OF BOTH EYES: Status: ACTIVE | Noted: 2017-09-01

## 2017-09-01 PROBLEM — H01.02A SQUAMOUS BLEPHARITIS OF UPPER AND LOWER EYELIDS OF BOTH EYES: Status: ACTIVE | Noted: 2017-09-01

## 2017-09-11 DIAGNOSIS — H10.33 ACUTE BACTERIAL CONJUNCTIVITIS OF BOTH EYES: ICD-10-CM

## 2017-09-11 NOTE — TELEPHONE ENCOUNTER
Reason for Call:  Medication or medication refill:    Do you use a [facility]?  Name of the pharmacy and phone number for the current request:  [facility] [place] - [phone]    Name of the medication requested: Pending Prescriptions:                       Disp   Refills    tobramycin (TOBREX) 0.3 % ophthalmic solu*1 Rosalina*0            Sig: Apply 1 drop to eye every 4 hours          Other request: Patient's eye is not better - no refills left on prescription    Can we leave a detailed message on this number? YES    Phone number patient can be reached at: Home number on file [phone] (home)    Best Time: Anytime    Call taken on 9/11/2017 at 3:51 PM by Naye Glass

## 2017-09-12 RX ORDER — TOBRAMYCIN 3 MG/ML
1 SOLUTION/ DROPS OPHTHALMIC EVERY 4 HOURS
Qty: 1 BOTTLE | Refills: 0 | Status: SHIPPED | OUTPATIENT
Start: 2017-09-12 | End: 2017-09-13

## 2017-09-12 NOTE — TELEPHONE ENCOUNTER
Called patient and advised that RN will call after eye drops are filled. Unsure how long it will be for PCP to review and advise. Do not want him waiting in case it will be awhile. Advised will call when hear from provider. Voiced understanding.  Britany Gonzalez RN.

## 2017-09-12 NOTE — TELEPHONE ENCOUNTER
Since improving refill is sent.  Follow up is needed if worsening or failure to entirely resolve with this in 5-7 days..  PSK

## 2017-09-12 NOTE — TELEPHONE ENCOUNTER
Call patient re:  Was it better and then worsened or no better at all from the medication.  If no better - repeating the same medication is not recommended.  If improved and then worsened again we may do same medication.      HAKEEM

## 2017-09-12 NOTE — TELEPHONE ENCOUNTER
tobramycin (TOBREX) 0.3 % ophthalmic solution      Last Written Prescription Date:  8/31/17  Last Fill Quantity: 1 bottle,   # refills: 0  Last Office Visit with [facility], [facility] or [facility] prescribing provider: 8/31/17 Dr. Casiano  Future Office visit:       Routing refill request to provider for review/approval because:  Drug not on the [facility], [facility] or [facility] refill protocol or controlled substance  Drug not active on patient's medication list

## 2017-09-12 NOTE — TELEPHONE ENCOUNTER
Reason for Call:  Other prescription    Detailed comments: waiting at pharmacy for eye drops please call  asap when ready    Phone Number Patient can be reached at: Cell number on file:    Telephone Information:   Mobile [phone]       Best Time: any    Can we leave a detailed message on this number? YES    Call taken on 9/12/2017 at 11:14 AM by Jelena Pineda

## 2017-09-12 NOTE — TELEPHONE ENCOUNTER
Call to patient and states that it is better with the eye drops. Not completely healed and would be fine if you needed to change the drops too.  Routing to provider to review and advise.  Britany Gonzalez RN.

## 2017-09-13 ENCOUNTER — TELEPHONE (OUTPATIENT)
Dept: FAMILY MEDICINE | Facility: CLINIC | Age: 82
End: 2017-09-13

## 2017-09-13 NOTE — TELEPHONE ENCOUNTER
Noted.  Patient was previously instructed to follow up if not resolved.  Will await his follow up.  HERACLIOK

## 2017-09-13 NOTE — TELEPHONE ENCOUNTER
Re: recent antibiotic he filled    Patient is not going to be taking this    Call if questions [phone]    Patient said you will know what he is talking about    Thank you

## 2017-11-27 DIAGNOSIS — R09.81 CONGESTION OF PARANASAL SINUS: ICD-10-CM

## 2017-11-28 RX ORDER — FLUTICASONE PROPIONATE 50 MCG
1-2 SPRAY, SUSPENSION (ML) NASAL DAILY
Qty: 1 BOTTLE | Refills: 3 | Status: SHIPPED | OUTPATIENT
Start: 2017-11-28 | End: 2018-09-11

## 2017-11-28 NOTE — TELEPHONE ENCOUNTER
Reason for Call:  Other prescription    Detailed comments: Leaving this afternon 11/28 please call me when approved.    Phone Number Patient can be reached at: Home number on file [phone] (home)    Best Time: any    Can we leave a detailed message on this number? YES    Call taken on 11/28/2017 at 9:59 AM by Jelena Pineda

## 2017-11-28 NOTE — TELEPHONE ENCOUNTER
Routing refill request to provider for review/approval because:  Medication is reported/historical.    Charlene Greenberg RN, [facility]

## 2017-11-28 NOTE — TELEPHONE ENCOUNTER
Medication (s): FLUTICASONE 50 MCG/ACT SPRAY    Have you contacted your pharmacy? NO  (72 Hr turn around time.)  Pharmacy: [facility] in [place] [address]    Phone: [phone]    Message: pt stated he will be traveling out of town tomorrow 11/28/17     What is the best number to contact you? Pt. Can be reached at ( [phone]  What time works best to contact you with in 4 hrs?   Is it okay to leave a message? Yes     Quincy Roman, Patient Rep  [facility]

## 2017-12-20 DIAGNOSIS — R09.81 CONGESTION OF PARANASAL SINUS: ICD-10-CM

## 2017-12-20 NOTE — TELEPHONE ENCOUNTER
fluticasone (FLONASE) 50 MCG/ACT spray      Last Written Prescription Date: 11/28/17  Last Fill Quantity: 1 bottle,  # refills: 3   Last Office Visit with [facility], [facility] or [facility] prescribing provider: 8/31/17

## 2017-12-21 RX ORDER — FLUTICASONE PROPIONATE 50 MCG
SPRAY, SUSPENSION (ML) NASAL
Qty: 16 ML | Refills: 0 | OUTPATIENT
Start: 2017-12-21

## 2017-12-21 NOTE — TELEPHONE ENCOUNTER
Patient has refills remaining with pharmacy.  Lynette Tenorio RN - Triage  [facility]

## 2018-07-05 ENCOUNTER — TELEPHONE (OUTPATIENT)
Dept: FAMILY MEDICINE | Facility: CLINIC | Age: 83
End: 2018-07-05

## 2018-07-05 ENCOUNTER — OFFICE VISIT (OUTPATIENT)
Dept: FAMILY MEDICINE | Facility: CLINIC | Age: 83
End: 2018-07-05
Payer: MEDICARE

## 2018-07-05 VITALS
HEART RATE: 85 BPM | DIASTOLIC BLOOD PRESSURE: 70 MMHG | RESPIRATION RATE: 18 BRPM | HEIGHT: 68 IN | OXYGEN SATURATION: 94 % | SYSTOLIC BLOOD PRESSURE: 90 MMHG | WEIGHT: 177 LBS | TEMPERATURE: 97.8 F | BODY MASS INDEX: 26.83 KG/M2

## 2018-07-05 DIAGNOSIS — R21 RASH: Primary | ICD-10-CM

## 2018-07-05 DIAGNOSIS — L28.2 PRURITIC RASH: ICD-10-CM

## 2018-07-05 LAB
ALBUMIN SERPL-MCNC: 3.8 G/DL (ref 3.4–5)
ALP SERPL-CCNC: 62 U/L (ref 40–150)
ALT SERPL W P-5'-P-CCNC: 33 U/L (ref 0–70)
ANION GAP SERPL CALCULATED.3IONS-SCNC: 8 MMOL/L (ref 3–14)
AST SERPL W P-5'-P-CCNC: 29 U/L (ref 0–45)
BASOPHILS # BLD AUTO: 0 10E9/L (ref 0–0.2)
BASOPHILS NFR BLD AUTO: 0.5 %
BILIRUB SERPL-MCNC: 0.6 MG/DL (ref 0.2–1.3)
BUN SERPL-MCNC: 12 MG/DL (ref 7–30)
CALCIUM SERPL-MCNC: 8.5 MG/DL (ref 8.5–10.1)
CHLORIDE SERPL-SCNC: 105 MMOL/L (ref 94–109)
CO2 SERPL-SCNC: 24 MMOL/L (ref 20–32)
CREAT SERPL-MCNC: 0.99 MG/DL (ref 0.66–1.25)
CRP SERPL-MCNC: <2.9 MG/L (ref 0–8)
DIFFERENTIAL METHOD BLD: ABNORMAL
EOSINOPHIL # BLD AUTO: 0.2 10E9/L (ref 0–0.7)
EOSINOPHIL NFR BLD AUTO: 3 %
ERYTHROCYTE [DISTWIDTH] IN BLOOD BY AUTOMATED COUNT: 13 % (ref 10–15)
ERYTHROCYTE [SEDIMENTATION RATE] IN BLOOD BY WESTERGREN METHOD: 8 MM/H (ref 0–20)
GFR SERPL CREATININE-BSD FRML MDRD: 72 ML/MIN/1.7M2
GLUCOSE SERPL-MCNC: 106 MG/DL (ref 70–99)
HCT VFR BLD AUTO: 38.6 % (ref 40–53)
HGB BLD-MCNC: 13.4 G/DL (ref 13.3–17.7)
LYMPHOCYTES # BLD AUTO: 2.2 10E9/L (ref 0.8–5.3)
LYMPHOCYTES NFR BLD AUTO: 32.6 %
MCH RBC QN AUTO: 32.8 PG (ref 26.5–33)
MCHC RBC AUTO-ENTMCNC: 34.7 G/DL (ref 31.5–36.5)
MCV RBC AUTO: 94 FL (ref 78–100)
MONOCYTES # BLD AUTO: 0.8 10E9/L (ref 0–1.3)
MONOCYTES NFR BLD AUTO: 11.9 %
NEUTROPHILS # BLD AUTO: 3.5 10E9/L (ref 1.6–8.3)
NEUTROPHILS NFR BLD AUTO: 52 %
PLATELET # BLD AUTO: 193 10E9/L (ref 150–450)
POTASSIUM SERPL-SCNC: 4.2 MMOL/L (ref 3.4–5.3)
PROT SERPL-MCNC: 6.7 G/DL (ref 6.8–8.8)
RBC # BLD AUTO: 4.09 10E12/L (ref 4.4–5.9)
SODIUM SERPL-SCNC: 137 MMOL/L (ref 133–144)
WBC # BLD AUTO: 6.7 10E9/L (ref 4–11)

## 2018-07-05 PROCEDURE — 99214 OFFICE O/P EST MOD 30 MIN: CPT | Performed by: FAMILY MEDICINE

## 2018-07-05 PROCEDURE — 85025 COMPLETE CBC W/AUTO DIFF WBC: CPT | Performed by: FAMILY MEDICINE

## 2018-07-05 PROCEDURE — 80053 COMPREHEN METABOLIC PANEL: CPT | Performed by: FAMILY MEDICINE

## 2018-07-05 PROCEDURE — 86140 C-REACTIVE PROTEIN: CPT | Performed by: FAMILY MEDICINE

## 2018-07-05 PROCEDURE — 85652 RBC SED RATE AUTOMATED: CPT | Performed by: FAMILY MEDICINE

## 2018-07-05 PROCEDURE — 36415 COLL VENOUS BLD VENIPUNCTURE: CPT | Performed by: FAMILY MEDICINE

## 2018-07-05 RX ORDER — CETIRIZINE HYDROCHLORIDE 10 MG/1
10 TABLET ORAL 2 TIMES DAILY
Qty: 60 TABLET | Refills: 1 | Status: SHIPPED | OUTPATIENT
Start: 2018-07-05 | End: 2018-08-31

## 2018-07-05 ASSESSMENT — PAIN SCALES - GENERAL: PAINLEVEL: MILD PAIN (2)

## 2018-07-05 NOTE — PATIENT INSTRUCTIONS
Labs today. I will notify you of results when I receive them. After labs received, I will notify you with recommendations to which speciality to follow up with.     Take Zyrtec 10mg, one tablet twice daily as anti itch.

## 2018-07-05 NOTE — LETTER
[address]  [address]  [phone]    July 9, 2018      Teddy Horner  [address]  [facility] [place] [address]      Dear Teddy,      Attached you will find a copy of your recent laboratory report.   Your labs do not show a particular cause for the rash.     Your kidney and liver testing are normal.   Your blood sugar is listed as elevated but this would be normal since you were not fasting for this appointment.   Your protein level is borderline low.  Be sure you are eating plenty of protein in diet.   Testing for inflammation is negative/normal.   Your white blood cell count is normal and the amount of each type of cell is normal as well.   We will be calling to discuss referral depending on response to the zyrtec.   Please call or MyChart message me if you have any questions.        Sincerely,         Whit Casiano MD

## 2018-07-05 NOTE — PROGRESS NOTES
"  SUBJECTIVE:   Teddy Horner is a 82 year old male who presents to clinic today for the following health issues:      Rash  Onset: Started about 6 weeks ago    Description:   Location: all over the body  Character: round, raised, red, pruritic  Itching (Pruritis): YES    Progression of Symptoms:  same    Accompanying Signs & Symptoms:  Fever: no   Body aches or joint pain: no   Sore throat symptoms: no   Recent cold symptoms: no     History:   Previous similar rash: no     Precipitating factors:   Exposure to similar rash: no   New exposures: None   Recent travel: no     Alleviating factors:  none    Therapies Tried and outcome: Heavy duty moisturizer Cerve,     Patient has seen primary care provider and dermatologist in [place]  tried:   Prednisone- prescribed by primary care provider and reported to have not provided relief to itch. This was started two months ago and has discontinued taking.   Hydroxyzine- prescribed by primary care provider and took one tablet at bedtime. He started this after prednisone therapy completed. He stated that Hydroxyzine induces an adverse effect of grogginess.   Cerave- prescribed by dermatology about six weeks ago and have reported to have helped itch symptoms a little bit, but not resolve symptoms.   Triamcinolone- prescribed by dermatology 6 weeks ago and reported to have not helped.    Eczema topical products- did not help symptoms.   Gold Bond- did not help symptoms.   Patient was advised to complete blood testing and biopsy, but has not completed this yet.     Rash was noted to occur all over suddenly and not in any particular area. No rash was seen located on the feet. Currently rash is also scattered all over body and patient reported that it feels like \"sand paper\".   He stated that skin is itchy almost all the time \"maybe 10 percent of the time it is not itching\".   Patient reports that some lesions appear pimple like.   Patient denies any new medications.   Patient reports " that he takes Amlodipine (half tablet twice daily) if blood pressures are higher than 120/80 and takes about 5 times a week. He has not taken Amlodipine today.   Patient reports that he takes Sotalol (half tablet twice daily) and is not new.   Denies anyone with similar rash symptoms.     Carpal Tunnel   Patient reports that he completed Carpal tunnel surgery 6 weeks ago and 9 weeks ago.     Back Pain   Patient reports that he has completed Bilateral Back Injection 5 or 6 weeks ago. He reported that it has been beneficial for now.         Problem list and histories reviewed & adjusted, as indicated.  Additional history: as documented    Patient Active Problem List   Diagnosis     Ocular migraine     Coronary artery disease involving coronary bypass graft of native heart without angina pectoris     History of TIA (transient ischemic attack) and stroke     Acquired hypothyroidism     Neuropathic pain, arm     Bilateral arm numbness and tingling while sleeping     DDD (degenerative disc disease), cervical     Advanced directives, counseling/discussion     Squamous blepharitis of upper and lower eyelids of both eyes     Past Surgical History:   Procedure Laterality Date     AS CABG, VEIN, THREE  2012     CATARACT IOL, RT/LT Bilateral      HERNIORRHAPHY INGUINAL Left      LAPAROSCOPIC CHOLECYSTECTOMY         Social History   Substance Use Topics     Smoking status: Never Smoker     Smokeless tobacco: Never Used     Alcohol use Yes     Family History   Problem Relation Age of Onset     Alcoholism Daughter          Current Outpatient Prescriptions   Medication Sig Dispense Refill     amLODIPine (NORVASC) 2.5 MG tablet Take 2.5 mg by mouth 2 times daily       aspirin 81 MG tablet Take 81 mg by mouth daily       atorvastatin (LIPITOR) 10 MG tablet Take 0.5 tablets (5 mg) by mouth daily 30 tablet 1     clopidogrel (PLAVIX) 75 MG tablet Take 75 mg by mouth daily       fluticasone (FLONASE) 50 MCG/ACT spray Spray 1-2 sprays  into both nostrils daily 1 Bottle 3     gabapentin (NEURONTIN) 100 MG capsule Take 1 capsule (100 mg) by mouth At Bedtime 30 capsule 0     levothyroxine (SYNTHROID/LEVOTHROID) 100 MCG tablet Take 1 tablet (100 mcg) by mouth daily 90 tablet 1     magnesium oxide 400 MG CAPS Take 400 mg by mouth 2 times daily       Multiple Vitamins-Minerals (CENTRUM SILVER) per tablet Take 1 tablet by mouth daily       Multiple Vitamins-Minerals (OCUVITE PO)        PANTOPRAZOLE SODIUM PO Take 40 mg by mouth every evening       sotalol (BETAPACE) 80 MG tablet Take 80 mg by mouth 2 times daily       sulindac (CLINORIL) 200 MG tablet Take 200 mg by mouth 2 times daily       tamsulosin (FLOMAX) 0.4 MG capsule Take 0.4 mg by mouth daily       Allergies   Allergen Reactions     Sulfa Drugs Hives     Recent Labs   Lab Test 05/22/17   A1C  5.7*   LDL  64   HDL  47   TRIG  57   CR  1   POTASSIUM  4.3   TSH  0.9      BP Readings from Last 3 Encounters:   07/05/18 90/70   08/31/17 92/64   07/13/17 128/82    Wt Readings from Last 3 Encounters:   07/05/18 80.3 kg (177 lb)   08/31/17 82.6 kg (182 lb)   07/13/17 80.1 kg (176 lb 8 oz)                  Labs reviewed in EPIC    Reviewed and updated as needed this visit by clinical staff  Tobacco  Allergies  Meds  Med Hx  Surg Hx  Fam Hx  Soc Hx      Reviewed and updated as needed this visit by Provider  Tobacco  Allergies  Meds  Med Hx  Surg Hx  Fam Hx  Soc Hx        ROS:  Constitutional, HEENT, cardiovascular, pulmonary, GI, , musculoskeletal, neuro, skin, endocrine and psych systems are negative, except as otherwise noted.    This document serves as a record of the services and decisions personally performed and made by Whit Casiano MD. It was created on her behalf by Zachery Seth, a trained medical scribe. The creation of this document is based on the provider's statements to the medical scribe.  Zachery Seth July 5, 2018 9:42 AM      OBJECTIVE:     BP 90/70 (BP Location: Right  "arm, Patient Position: Sitting, Cuff Size: Adult Regular)  Pulse 85  Temp 97.8  F (36.6  C) (Oral)  Resp 18  Ht 1.727 m (5' 8\")  Wt 80.3 kg (177 lb)  SpO2 94%  BMI 26.91 kg/m2  Body mass index is 26.91 kg/(m^2).  GENERAL: healthy, alert and no distress  EYES: Eyes grossly normal to inspection, PERRL and conjunctivae and sclerae normal  NECK: no adenopathy, no asymmetry, masses, or scars and thyroid normal to palpation  RESP: lungs clear to auscultation - no rales, rhonchi or wheezes  CV: regular rate and rhythm, normal S1 S2, no S3 or S4, no murmur, click or rub, no peripheral edema and peripheral pulses strong  ABDOMEN: soft, nontender, no hepatosplenomegaly, no masses and bowel sounds normal  MS: no gross musculoskeletal defects noted, no edema  SKIN: scattered erythematous rash on skin not present on feet with scattered raised erythematous papules. Some evidence of excoriation noted, but without drainage or secondary infection noted.   NEURO: Normal strength and tone, mentation intact and speech normal  PSYCH: mentation appears normal, affect normal/bright    Diagnostic Test Results:  No results found for this or any previous visit (from the past 24 hour(s)).  Results for orders placed or performed in visit on 07/05/18   CBC with platelets and differential   Result Value Ref Range    WBC 6.7 4.0 - 11.0 10e9/L    RBC Count 4.09 (L) 4.4 - 5.9 10e12/L    Hemoglobin 13.4 13.3 - 17.7 g/dL    Hematocrit 38.6 (L) 40.0 - 53.0 %    MCV 94 78 - 100 fl    MCH 32.8 26.5 - 33.0 pg    MCHC 34.7 31.5 - 36.5 g/dL    RDW 13.0 10.0 - 15.0 %    Platelet Count 193 150 - 450 10e9/L    Diff Method Automated Method     % Neutrophils 52.0 %    % Lymphocytes 32.6 %    % Monocytes 11.9 %    % Eosinophils 3.0 %    % Basophils 0.5 %    Absolute Neutrophil 3.5 1.6 - 8.3 10e9/L    Absolute Lymphocytes 2.2 0.8 - 5.3 10e9/L    Absolute Monocytes 0.8 0.0 - 1.3 10e9/L    Absolute Eosinophils 0.2 0.0 - 0.7 10e9/L    Absolute Basophils 0.0 " "0.0 - 0.2 10e9/L   ESR: Erythrocyte sedimentation rate   Result Value Ref Range    Sed Rate 8 0 - 20 mm/h   CRP, inflammation   Result Value Ref Range    CRP Inflammation <2.9 0.0 - 8.0 mg/L   Comprehensive metabolic panel   Result Value Ref Range    Sodium 137 133 - 144 mmol/L    Potassium 4.2 3.4 - 5.3 mmol/L    Chloride 105 94 - 109 mmol/L    Carbon Dioxide 24 20 - 32 mmol/L    Anion Gap 8 3 - 14 mmol/L    Glucose 106 (H) 70 - 99 mg/dL    Urea Nitrogen 12 7 - 30 mg/dL    Creatinine 0.99 0.66 - 1.25 mg/dL    GFR Estimate 72 >60 mL/min/1.7m2    GFR Estimate If Black 87 >60 mL/min/1.7m2    Calcium 8.5 8.5 - 10.1 mg/dL    Bilirubin Total 0.6 0.2 - 1.3 mg/dL    Albumin 3.8 3.4 - 5.0 g/dL    Protein Total 6.7 (L) 6.8 - 8.8 g/dL    Alkaline Phosphatase 62 40 - 150 U/L    ALT 33 0 - 70 U/L    AST 29 0 - 45 U/L       ASSESSMENT/PLAN:     Tobacco Cessation:   reports that he has never smoked. He has never used smokeless tobacco.      BMI:   Estimated body mass index is 26.91 kg/(m^2) as calculated from the following:    Height as of this encounter: 1.727 m (5' 8\").    Weight as of this encounter: 80.3 kg (177 lb).         1. Rash  Labs today. I will notify patient of results when I receive them. After labs received, I will also notify patient with recommendations to which speciality to follow up with depending on results and response to the zyrtec.  - CBC with platelets and differential  - ESR: Erythrocyte sedimentation rate  - CRP, inflammation  - Comprehensive metabolic panel    2. Pruritic rash  Patient will begin Zyrtec 10mg, one tablet twice daily as anti itch.   - cetirizine (ZYRTEC) 10 MG tablet; Take 1 tablet (10 mg) by mouth 2 times daily  Dispense: 60 tablet; Refill: 1    Patient instructions discussed with patient    Patient Instructions   Labs today. I will notify you of results when I receive them. After labs received, I will notify you with recommendations to which speciality to follow up with.     Take " Zyrtec 10mg, one tablet twice daily as anti itch.             The information in this document, created by the medical scribe for me, accurately reflects the services I personally performed and the decisions made by me. I have reviewed and approved this document for accuracy prior to leaving the patient care area.  July 5, 2018 10:24 AM      Whit Casiano MD  [facility]

## 2018-07-05 NOTE — TELEPHONE ENCOUNTER
PA for Cetirizine 10mg tablets    ID# [id number]    Phone# [phone]    PA or alternative    Dana Partida

## 2018-07-05 NOTE — MR AVS SNAPSHOT
"              After Visit Summary   7/5/2018    Teddy Horner    MRN: [id number]           Patient Information     Date Of Birth          1935        Visit Information        Provider Department      7/5/2018 9:20 AM Whit Casiano MD [facility]        Today's Diagnoses     Rash    -  1    Pruritic rash          Care Instructions    Labs today. I will notify you of results when I receive them. After labs received, I will notify you with recommendations to which speciality to follow up with.     Take Zyrtec 10mg, one tablet twice daily as anti itch.                 Follow-ups after your visit        Who to contact     If you have questions or need follow up information about today's clinic visit or your schedule please contact [facility] directly at [phone].  Normal or non-critical lab and imaging results will be communicated to you by MyChart, letter or phone within 4 business days after the clinic has received the results. If you do not hear from us within 7 days, please contact the clinic through MyChart or phone. If you have a critical or abnormal lab result, we will notify you by phone as soon as possible.  Submit refill requests through [employer] or call your pharmacy and they will forward the refill request to us. Please allow 3 business days for your refill to be completed.          Additional Information About Your Visit        Care EveryWhere ID     This is your Care EveryWhere ID. This could be used by other organizations to access your [place] medical records  [id number]        Your Vitals Were     Pulse Temperature Respirations Height Pulse Oximetry BMI (Body Mass Index)    85 97.8  F (36.6  C) (Oral) 18 1.727 m (5' 8\") 94% 26.91 kg/m2       Blood Pressure from Last 3 Encounters:   07/05/18 90/70   08/31/17 92/64   07/13/17 128/82    Weight from Last 3 Encounters:   07/05/18 80.3 kg (177 lb)   08/31/17 82.6 kg (182 lb)   07/13/17 80.1 kg (176 lb 8 oz)         "      We Performed the Following     CBC with platelets and differential     Comprehensive metabolic panel     CRP, inflammation     ESR: Erythrocyte sedimentation rate          Today's Medication Changes          These changes are accurate as of 7/5/18 10:23 AM.  If you have any questions, ask your nurse or doctor.               Start taking these medicines.        Dose/Directions    cetirizine 10 MG tablet   Commonly known as:  zyrTEC   Used for:  Pruritic rash   Started by:  Whit Casiano MD        Dose:  10 mg   Take 1 tablet (10 mg) by mouth 2 times daily   Quantity:  60 tablet   Refills:  1            Where to get your medicines      These medications were sent to [facility] [address] [place], [place] - [address]  [id number] [place] [address] [address], [facility] [address]     Phone:  [phone]     cetirizine 10 MG tablet                Primary Care Provider Office Phone # Fax #    Whit Casiano [phone] [address]  [facility] [address]        Equal Access to Services     [facility]: Hadii aad ku hadasho Soomaali, waaxda luqadaha, qaybta kaalmada adeegyada, waxay idiin hayaan antonette kimaraguanaco roach . So [facility] [phone].    ATENCIÓN: Si habla español, tiene a knutson disposición servicios gratuitos de asistencia lingüística. [facility] [phone].    We comply with applicable federal civil rights laws and [place] laws. We do not discriminate on the basis of race, color, national origin, age, disability, sex, sexual orientation, or gender identity.            Thank you!     Thank you for choosing [facility]  for your care. Our goal is always to provide you with excellent care. Hearing back from our patients is one way we can continue to improve our services. Please take a few minutes to complete the written survey that you may receive in the mail after your visit with us. Thank you!             Your Updated Medication List - Protect others around  you: Learn how to safely use, store and throw away your medicines at www.disposemymeds.org.          This list is accurate as of 7/5/18 10:23 AM.  Always use your most recent med list.                   Brand Name Dispense Instructions for use Diagnosis    amLODIPine 2.5 MG tablet    NORVASC     Take 2.5 mg by mouth 2 times daily        aspirin 81 MG tablet      Take 81 mg by mouth daily        atorvastatin 10 MG tablet    LIPITOR    30 tablet    Take 0.5 tablets (5 mg) by mouth daily        BETAPACE 80 MG tablet   Generic drug:  sotalol      Take 80 mg by mouth 2 times daily        * CENTRUM SILVER per tablet      Take 1 tablet by mouth daily        * OCUVITE PO           cetirizine 10 MG tablet    zyrTEC    60 tablet    Take 1 tablet (10 mg) by mouth 2 times daily    Pruritic rash       FLOMAX 0.4 MG capsule   Generic drug:  tamsulosin      Take 0.4 mg by mouth daily        fluticasone 50 MCG/ACT spray    FLONASE    1 Bottle    Spray 1-2 sprays into both nostrils daily    Congestion of paranasal sinus       gabapentin 100 MG capsule    NEURONTIN    30 capsule    Take 1 capsule (100 mg) by mouth At Bedtime    Bilateral arm numbness and tingling while sleeping, DDD (degenerative disc disease), cervical, Neuropathic pain, arm       levothyroxine 100 MCG tablet    SYNTHROID/LEVOTHROID    90 tablet    Take 1 tablet (100 mcg) by mouth daily        magnesium oxide 400 MG Caps      Take 400 mg by mouth 2 times daily        PANTOPRAZOLE SODIUM PO      Take 40 mg by mouth every evening        PLAVIX 75 MG tablet   Generic drug:  clopidogrel      Take 75 mg by mouth daily        sulindac 200 MG tablet    CLINORIL     Take 200 mg by mouth 2 times daily        * Notice:  This list has 2 medication(s) that are the same as other medications prescribed for you. Read the directions carefully, and ask your doctor or other care provider to review them with you.

## 2018-07-06 ENCOUNTER — TELEPHONE (OUTPATIENT)
Dept: FAMILY MEDICINE | Facility: CLINIC | Age: 83
End: 2018-07-06

## 2018-07-06 DIAGNOSIS — L28.2 PRURITIC RASH: Primary | ICD-10-CM

## 2018-07-06 NOTE — TELEPHONE ENCOUNTER
Please notify patient that insurance does not appear to cover the zyrtec - he can take OTC 1 pill twice a day.    HAKEEM

## 2018-07-06 NOTE — TELEPHONE ENCOUNTER
Reason for Call:  Other     Detailed comments: patient had labs done on 7/5/2018 and asking for the results    Phone Number Patient can be reached at: Home number on file [phone] (home)    Best Time: any    Can we leave a detailed message on this number? YES    Call taken on 7/6/2018 at 4:30 PM by Mayra Delgado

## 2018-07-06 NOTE — TELEPHONE ENCOUNTER
Called pharmacy, their system automatically sends those through.    Patient has already purchased OTC

## 2018-07-07 NOTE — TELEPHONE ENCOUNTER
Please call patient re:  Results  - see below.  Also question him as to response to the zyrtec. IF symptoms much better on the zyrtec - I would recommend allergy evaluation.  IF symptoms not improved on zyrtec, evaluation with dermatology for possible biopsy is recommended.      PSK          Attached you will find a copy of your recent laboratory report.  Your labs do not show a particular cause for the rash.    Your kidney and liver testing are normal.  Your blood sugar is listed as elevated but this would be normal since you were not fasting for this appointment.  Your protein level is borderline low.  Be sure you are eating plenty of protein in diet.   Testing for inflammation is negative/normal.  Your white blood cell count is normal and the amount of each type of cell is normal as well.  We will be calling to discuss referral depending on response to the zyrtec.  Please call or MyChart message me if you have any questions.      Sincerely,         Whit Casiano MD

## 2018-07-07 NOTE — PROGRESS NOTES
Please print letter and attach results.  PSK      Attached you will find a copy of your recent laboratory report.  Your labs do not show a particular cause for the rash.    Your kidney and liver testing are normal.  Your blood sugar is listed as elevated but this would be normal since you were not fasting for this appointment.  Your protein level is borderline low.  Be sure you are eating plenty of protein in diet.   Testing for inflammation is negative/normal.  Your white blood cell count is normal and the amount of each type of cell is normal as well.  We will be calling to discuss referral depending on response to the zyrtec.  Please call or MyChart message me if you have any questions.    Sincerely,         Whit Casiano MD

## 2018-07-09 NOTE — TELEPHONE ENCOUNTER
..Reason for Call:  Other     Detailed comments: Patient said he would like a call back.    Phone Number Patient can be reached at: Home number on file [phone] (home)    Best Time: anytime    Can we leave a detailed message on this number? NO    Call taken on 7/9/2018 at 10:11 AM by Luisana Cameron

## 2018-07-09 NOTE — TELEPHONE ENCOUNTER
"Called and spoke with patient regarding provider message below. Patient reports that the Zyrtec has not been majorly improving symptoms, he believes it is \"not much better.\" Patient at first said he was open to seeing dermatology but then patient stated that the would prefer a referral to an allergist for evaluation as he feels he has spent too much money on creams/lotions and wants to determine what the issue is.     Routing to provider to review and advise on referral request/pt preference.     Corinna Tan RN    "

## 2018-07-09 NOTE — TELEPHONE ENCOUNTER
This writer attempted to contact Teddy on 07/09/18      Reason for call abnormal results and left detailed message.      If patient calls back:   Patient contacted by a Registered Nurse. Inform patient that someone from the RN group will contact them, document that pt called and route to P DYAD 3 RN POOL [[id number]]      Corinna Tan RN

## 2018-07-09 NOTE — TELEPHONE ENCOUNTER
..Reason for Call:   call back -adding to the message    Detailed comments: would like to have the Bx and also be referred to an allergist    Phone Number Patient can be reached at: Home number on file [phone] (home)    Best Time: anytime    Can we leave a detailed message on this number? YES    Call taken on 7/9/2018 at 10:38 AM by Maryana Quinonez  ;

## 2018-07-10 NOTE — TELEPHONE ENCOUNTER
We can have him return to clinic here for biopsy of one of the spots and I have placed a referral for allergy.  See copied referral below. Please give patient # for scheduling.  PSK      ALLERGY/ASTHMA ADULT REFERRAL [[id number]]      Electronically signed by: Whit Casiano MD on 07/10/18 0709 Status: Active     Ordering user: Whit Casiano MD 07/10/18 0709                 Order History  Outpatient     Date/Time Action Taken User Additional Information     07/10/18 0709 Sign Whit Casiano MD        Comments      Your provider [place] you [place] [facility] [place] [facility] [phone]  [email or web address]    Please be aware that coverage of these services is subject to the terms and limitations of your health insurance plan.  Call member services at your health plan with any benefit or coverage questions.      Please bring the following with you to your appointment:    (1) Any X-Rays, CTs or MRIs which have been performed.  Contact the facility where they were done to arrange for  prior to your scheduled appointment.    (2) List of current medications  (3) This referral request   (4) Any documents/labs given to you for this referral       Associated Diagnoses      Pruritic rash [L28.2]  - Primary

## 2018-07-12 ENCOUNTER — OFFICE VISIT (OUTPATIENT)
Dept: ALLERGY | Facility: CLINIC | Age: 83
End: 2018-07-12
Payer: MEDICARE

## 2018-07-12 VITALS
TEMPERATURE: 97.5 F | RESPIRATION RATE: 18 BRPM | OXYGEN SATURATION: 97 % | HEART RATE: 79 BPM | BODY MASS INDEX: 27.06 KG/M2 | SYSTOLIC BLOOD PRESSURE: 126 MMHG | WEIGHT: 178 LBS | DIASTOLIC BLOOD PRESSURE: 80 MMHG

## 2018-07-12 DIAGNOSIS — L29.9 ITCHING: ICD-10-CM

## 2018-07-12 DIAGNOSIS — R21 RASH: Primary | ICD-10-CM

## 2018-07-12 PROCEDURE — 99204 OFFICE O/P NEW MOD 45 MIN: CPT | Performed by: ALLERGY & IMMUNOLOGY

## 2018-07-12 RX ORDER — AMITRIPTYLINE HYDROCHLORIDE 10 MG/1
10 TABLET ORAL AT BEDTIME
Qty: 30 TABLET | Refills: 0 | Status: SHIPPED | OUTPATIENT
Start: 2018-07-12 | End: 2018-07-23

## 2018-07-12 NOTE — Clinical Note
7/12/2018         RE: Teddy Horner  [address]  [facility] [address]        Dear Colleague,    Thank you for referring your patient, Teddy Horner, to the [facility]. Please see a copy of my visit note below.    Dear Whit Casiano MD,    Thank you for referring your patient Teddy Horner to the Allergy/Immunology Clinic. Teddy Horner was seen in the Allergy Clinic at {Allergyclinics:676949}. The following are my recommendations regarding his {Allergydiagnoses:233359}    Teddy Horner is a 82 year old White male being seen today at the request of Dr. Casiano in consultation for rash and itching.    Had carpal tunnel surgery and injections in his back for low back pain. Wonders if he was allergic to something from these procedures. Procedures were in the lat 3 months. Has had rash and itching for the past 2 months. Reports having both pimple sized lesions and blotchy rash. If he picks the pimples they are painful. Itching present throughout the day and night. Improves with topical creams - currently using OTC products only. Has been using cerave lotion, Aveeno, benadryl gel. Has upcoming appointment with Dr. Casiano for biopsy.    Saw dermatologist in [place] and was told they didn't know the cause of the rash. Prescribed triamcinolone cream. Does help a bit but not significantly. Also prescribed prednisone 1 month ago but did not feel that this was helpful - took it for 3 weeks.    No new medications or supplements. No changes in environment, exposures, or diet. Started in [place] where he lives most of the year and has continued since returning to [place] for the summer.    Cetirizine is helpful for itching but doesn't provide complete relief. Only taking it for the last week or so.    Had allergy testing in [place] 6 years ago and was negative per patient report.      Component      Latest Ref Rng & Units 7/5/2018   WBC      4.0 - 11.0 10e9/L 6.7   RBC Count       4.4 - 5.9 10e12/L 4.09 (L)   Hemoglobin      13.3 - 17.7 g/dL 13.4   Hematocrit      40.0 - 53.0 % 38.6 (L)   MCV      78 - 100 fl 94   MCH      26.5 - 33.0 pg 32.8   MCHC      31.5 - 36.5 g/dL 34.7   RDW      10.0 - 15.0 % 13.0   Platelet Count      150 - 450 10e9/L 193   Diff Method       Automated Method   % Neutrophils      % 52.0   % Lymphocytes      % 32.6   % Monocytes      % 11.9   % Eosinophils      % 3.0   % Basophils      % 0.5   Absolute Neutrophil      1.6 - 8.3 10e9/L 3.5   Absolute Lymphocytes      0.8 - 5.3 10e9/L 2.2   Absolute Monocytes      0.0 - 1.3 10e9/L 0.8   Absolute Eosinophils      0.0 - 0.7 10e9/L 0.2   Absolute Basophils      0.0 - 0.2 10e9/L 0.0   Sodium      133 - 144 mmol/L 137   Potassium      3.4 - 5.3 mmol/L 4.2   Chloride      94 - 109 mmol/L 105   Carbon Dioxide      20 - 32 mmol/L 24   Anion Gap      3 - 14 mmol/L 8   Glucose      70 - 99 mg/dL 106 (H)   Urea Nitrogen      7 - 30 mg/dL 12   Creatinine      0.66 - 1.25 mg/dL 0.99   GFR Estimate      >60 mL/min/1.7m2 72   GFR Estimate If Black      >60 mL/min/1.7m2 87   Calcium      8.5 - 10.1 mg/dL 8.5   Bilirubin Total      0.2 - 1.3 mg/dL 0.6   Albumin      3.4 - 5.0 g/dL 3.8   Protein Total      6.8 - 8.8 g/dL 6.7 (L)   Alkaline Phosphatase      40 - 150 U/L 62   ALT      0 - 70 U/L 33   AST      0 - 45 U/L 29   Sed Rate      0 - 20 mm/h 8   CRP Inflammation      0.0 - 8.0 mg/L <2.9       PAST MEDICAL HISTORY:  CAD s/p bypass surgery    Family History   Problem Relation Age of Onset     Alcoholism Daughter      Past Surgical History:   Procedure Laterality Date     AS CABG, VEIN, THREE  2012     CATARACT IOL, RT/LT Bilateral      HERNIORRHAPHY INGUINAL Left      LAPAROSCOPIC CHOLECYSTECTOMY         ENVIRONMENTAL HISTORY: The [place]. The home is heated with a forced air. They does have central air conditioning. The patient's bedroom is furnished with feather/wool bedding or pillows and  carpeting in bedroom.  Pets inside the house include None. There is not history of cockroach or mice infestation. There is/are 0 smokers in the house.  The house does not have a damp basement.     SOCIAL HISTORY:   Teddy is retired, previously employed as ***. He lives with his spouse.  His spouse works as a retired.    REVIEW OF SYSTEMS:  General: negative for weight gain. negative for weight loss. negative for changes in sleep.   Eyes: positive  for itching. negative for redness. negative for tearing/watering. negative for vision changes  Ears: negative for fullness. negative for hearing loss. negative for dizziness.   Nose: negative for snoring.negative for changes in smell. positive  for drainage.   Throat: negative for hoarseness. negative for sore throat. negative for trouble swallowing.   Lungs: negative for cough. negative for shortness of breath.negative for wheezing. negative for sputum production.   Cardiovascular: negative for chest pain. negative for swelling of ankles. negative for fast or irregular heartbeat.   Gastrointestinal: negative for nausea. negative for heartburn. negative for acid reflux.   Musculoskeletal: negative for joint pain. negative for joint stiffness. negative for joint swelling.   Neurologic: negative for seizures. negative for fainting. negative for weakness.   Psychiatric: negative for changes in mood. negative for anxiety.   Endocrine: negative for cold intolerance. negative for heat intolerance. negative for tremors.   Hematologic: negative for easy bruising. negative for easy bleeding.  Integumentary: positive  for rash. negative for scaling. negative for nail changes.       Current Outpatient Prescriptions:      amLODIPine (NORVASC) 2.5 MG tablet, Take 2.5 mg by mouth 2 times daily, Disp: , Rfl:      aspirin 81 MG tablet, Take 81 mg by mouth daily, Disp: , Rfl:      atorvastatin (LIPITOR) 10 MG tablet, Take 0.5 tablets (5 mg) by mouth daily, Disp: 30 tablet, Rfl: 1      "cetirizine (ZYRTEC) 10 MG tablet, Take 1 tablet (10 mg) by mouth 2 times daily, Disp: 60 tablet, Rfl: 1     clopidogrel (PLAVIX) 75 MG tablet, Take 75 mg by mouth daily, Disp: , Rfl:      gabapentin (NEURONTIN) 100 MG capsule, Take 1 capsule (100 mg) by mouth At Bedtime, Disp: 30 capsule, Rfl: 0     levothyroxine (SYNTHROID/LEVOTHROID) 100 MCG tablet, Take 1 tablet (100 mcg) by mouth daily, Disp: 90 tablet, Rfl: 1     magnesium oxide 400 MG CAPS, Take 400 mg by mouth 2 times daily, Disp: , Rfl:      Multiple Vitamins-Minerals (CENTRUM SILVER) per tablet, Take 1 tablet by mouth daily, Disp: , Rfl:      PANTOPRAZOLE SODIUM PO, Take 40 mg by mouth every evening, Disp: , Rfl:      sotalol (BETAPACE) 80 MG tablet, Take 80 mg by mouth 2 times daily, Disp: , Rfl:      sulindac (CLINORIL) 200 MG tablet, Take 200 mg by mouth 2 times daily, Disp: , Rfl:      fluticasone (FLONASE) 50 MCG/ACT spray, Spray 1-2 sprays into both nostrils daily (Patient not taking: Reported on 7/12/2018), Disp: 1 Bottle, Rfl: 3     Multiple Vitamins-Minerals (OCUVITE PO), , Disp: , Rfl:      tamsulosin (FLOMAX) 0.4 MG capsule, Take 0.4 mg by mouth daily, Disp: , Rfl:   Immunization History   Administered Date(s) Administered     Pneumococcal 23 valent 08/30/2014     TD (ADULT, 7+) 08/31/2014     Allergies   Allergen Reactions     Sulfa Drugs Hives       EXAM:   /80 (BP Location: Left arm, Patient Position: Sitting, Cuff Size: Adult Regular)  Pulse 79  Temp 97.5  F (36.4  C) (Oral)  Resp 18  Wt 80.7 kg (178 lb)  SpO2 97%  BMI 27.06 kg/m2  GENERAL APPEARANCE: alert, cooperative and not in distress  SKIN: erythematous papular rash on upper and lower extremities, patient points to \"blotches\" on his skin but no other rash appreciated  HEAD: atraumatic, normocephalic  EYES: lids and lashes normal, conjunctivae and sclerae clear, pupils equal, round, reactive to light, EOM full and intact  ENT: no scars or lesions, nasal exam showed no " discharge, swelling or lesions noted, tongue midline and normal, soft palate, uvula, and tonsils normal  NECK: no asymmetry, masses, or scars, supple without significant adenopathy  LUNGS: unlabored respirations, no intercostal retractions or accessory muscle use, clear to auscultation without rales or wheezes  HEART: regular rate and rhythm without murmurs and normal S1 and S2  MUSCULOSKELETAL: no musculoskeletal defects are noted  NEURO: no focal deficits noted  PSYCH: does not appear depressed or anxious    WORKUP: None    ASSESSMENT/PLAN:  Teddy Horner is a 82 year old male    ***      Larry Gloria MD  Allergy/Immunology  [place], [place]      Chart documentation done in part with Dragon Voice Recognition Software. Although reviewed after completion, some word and grammatical errors may remain.      Again, thank you for allowing me to participate in the care of your patient.        Sincerely,        Larry Gloria MD

## 2018-07-12 NOTE — MR AVS SNAPSHOT
After Visit Summary   7/12/2018    Teddy Horner    MRN: [id number]           Patient Information     Date Of Birth          1935        Visit Information        Provider Department      7/12/2018 10:20 AM Larry Gloria MD [facility]        Today's Diagnoses     Rash    -  1    Itching          Care Instructions    If you have any questions regarding your allergies, asthma, or what we discussed during your visit today please call the allergy clinic or contact us via QuNano.    [facility]: [phone]    Continue to take the zyrtec (cetirizine) 10mg twice a day    Try the amitriptyline 10mg at bedtime - this medication may make you sleepy    You can try Sarna lotion - available over the counter    Follow-up with Dr. Casiano for the biopsy next week            Follow-ups after your visit        Your next 10 appointments already scheduled     Jul 17, 2018 10:00 AM CDT   Office Visit with Whit Casiano MD, BA PROCEDURE ROOM   [facility]    [address] [place] [id number] [phone]           Bring a current list of meds and any records pertaining to this visit. For Physicals, please bring immunization records and any forms needing to be filled out. Please arrive 10 minutes early to complete paperwork.              Who to contact     If you have questions or need follow up information about today's clinic visit or your schedule please contact [facility] directly at [phone].  Normal or non-critical lab and imaging results will be communicated to you by MyChart, letter or phone within 4 business days after the clinic has received the results. If you do not hear from us within 7 days, please contact the clinic through TourPalhart or phone. If you have a critical or abnormal lab result, we will notify you by phone as soon as possible.  Submit refill requests through Aisle50t or  call your pharmacy and they will forward the refill request to us. Please allow 3 business days for your refill to be completed.          Additional Information About Your Visit        Care EveryWhere ID     This is your Care EveryWhere ID. This could be used by other organizations to access your [facility] medical records  [id number]        Your Vitals Were     Pulse Temperature Respirations Pulse Oximetry BMI (Body Mass Index)       79 97.5  F (36.4  C) (Oral) 18 97% 27.06 kg/m2        Blood Pressure from Last 3 Encounters:   07/12/18 126/80   07/05/18 90/70   08/31/17 92/64    Weight from Last 3 Encounters:   07/12/18 80.7 kg (178 lb)   07/05/18 80.3 kg (177 lb)   08/31/17 82.6 kg (182 lb)              Today, you had the following     No orders found for display         Today's Medication Changes          These changes are accurate as of 7/12/18 10:54 AM.  If you have any questions, ask your nurse or doctor.               Start taking these medicines.        Dose/Directions    amitriptyline 10 MG tablet   Commonly known as:  ELAVIL   Used for:  Itching   Started by:  Larry Gloria MD        Dose:  10 mg   Take 1 tablet (10 mg) by mouth At Bedtime   Quantity:  30 tablet   Refills:  0            Where to get your medicines      These medications were sent to [facility] [address] [place] [address]     Phone:  [phone]     amitriptyline 10 MG tablet                Primary Care Provider Office Phone # Fax #    Whit Casiano [phone] [address] [facility] [address]        Equal Access to Services     [facility]: Hadii aad ku hadasho Soomaali, waaxda luqadaha, qaybta kaalmada adeegyada, tennille nina. So [facility] [phone].    ATENCIÓN: Si habla español, tiene a knutson disposición servicios gratuitos de asistencia lingüística. Llame al [phone].    We comply with  applicable federal civil rights laws and [place] laws. We do not discriminate on the basis of race, color, national origin, age, disability, sex, sexual orientation, or gender identity.            Thank you!     Thank you for choosing [facility]  for your care. Our goal is always to provide you with excellent care. Hearing back from our patients is one way we can continue to improve our services. Please take a few minutes to complete the written survey that you may receive in the mail after your visit with us. Thank you!             Your Updated Medication List - Protect others around you: Learn how to safely use, store and throw away your medicines at www.disposemymeds.org.          This list is accurate as of 7/12/18 10:54 AM.  Always use your most recent med list.                   Brand Name Dispense Instructions for use Diagnosis    amitriptyline 10 MG tablet    ELAVIL    30 tablet    Take 1 tablet (10 mg) by mouth At Bedtime    Itching       amLODIPine 2.5 MG tablet    NORVASC     Take 2.5 mg by mouth 2 times daily        aspirin 81 MG tablet      Take 81 mg by mouth daily        atorvastatin 10 MG tablet    LIPITOR    30 tablet    Take 0.5 tablets (5 mg) by mouth daily        BETAPACE 80 MG tablet   Generic drug:  sotalol      Take 80 mg by mouth 2 times daily        * CENTRUM SILVER per tablet      Take 1 tablet by mouth daily        * OCUVITE PO           cetirizine 10 MG tablet    zyrTEC    60 tablet    Take 1 tablet (10 mg) by mouth 2 times daily    Pruritic rash       FLOMAX 0.4 MG capsule   Generic drug:  tamsulosin      Take 0.4 mg by mouth daily        fluticasone 50 MCG/ACT spray    FLONASE    1 Bottle    Spray 1-2 sprays into both nostrils daily    Congestion of paranasal sinus       gabapentin 100 MG capsule    NEURONTIN    30 capsule    Take 1 capsule (100 mg) by mouth At Bedtime    Bilateral arm numbness and tingling while sleeping, DDD (degenerative disc disease), cervical,  Neuropathic pain, arm       levothyroxine 100 MCG tablet    SYNTHROID/LEVOTHROID    90 tablet    Take 1 tablet (100 mcg) by mouth daily        magnesium oxide 400 MG Caps      Take 400 mg by mouth 2 times daily        PANTOPRAZOLE SODIUM PO      Take 40 mg by mouth every evening        PLAVIX 75 MG tablet   Generic drug:  clopidogrel      Take 75 mg by mouth daily        sulindac 200 MG tablet    CLINORIL     Take 200 mg by mouth 2 times daily        * Notice:  This list has 2 medication(s) that are the same as other medications prescribed for you. Read the directions carefully, and ask your doctor or other care provider to review them with you.

## 2018-07-12 NOTE — PATIENT INSTRUCTIONS
If you have any questions regarding your allergies, asthma, or what we discussed during your visit today please call the allergy clinic or contact us via Cross Current.    [facility]: [phone]    Continue to take the zyrtec (cetirizine) 10mg twice a day    Try the amitriptyline 10mg at bedtime - this medication may make you sleepy    You can try Sarna lotion - available over the counter    Follow-up with Dr. Casiano for the biopsy next week

## 2018-07-17 ENCOUNTER — OFFICE VISIT (OUTPATIENT)
Dept: FAMILY MEDICINE | Facility: CLINIC | Age: 83
End: 2018-07-17
Payer: MEDICARE

## 2018-07-17 ENCOUNTER — TELEPHONE (OUTPATIENT)
Dept: FAMILY MEDICINE | Facility: CLINIC | Age: 83
End: 2018-07-17

## 2018-07-17 VITALS
SYSTOLIC BLOOD PRESSURE: 96 MMHG | WEIGHT: 180 LBS | HEART RATE: 82 BPM | DIASTOLIC BLOOD PRESSURE: 58 MMHG | BODY MASS INDEX: 27.37 KG/M2 | OXYGEN SATURATION: 98 % | TEMPERATURE: 97.5 F

## 2018-07-17 DIAGNOSIS — L20.9 ATOPIC DERMATITIS, UNSPECIFIED TYPE: ICD-10-CM

## 2018-07-17 DIAGNOSIS — R21 RASH: Primary | ICD-10-CM

## 2018-07-17 PROCEDURE — 99207 ZZC NO CHARGE LOS: CPT | Performed by: FAMILY MEDICINE

## 2018-07-17 PROCEDURE — 11101 HC BIOPSY SKIN/SUBQ/MUC MEM, EACH ADDTL LESION: CPT | Performed by: FAMILY MEDICINE

## 2018-07-17 PROCEDURE — 88312 SPECIAL STAINS GROUP 1: CPT | Mod: 26 | Performed by: FAMILY MEDICINE

## 2018-07-17 PROCEDURE — 11100 HC BIOPSY SKIN/SUBQ/MUC MEM, SINGLE LESION: CPT | Performed by: FAMILY MEDICINE

## 2018-07-17 PROCEDURE — 00000159 ZZHCL STATISTIC H-SEND OUTS PREP: Performed by: FAMILY MEDICINE

## 2018-07-17 PROCEDURE — 88312 SPECIAL STAINS GROUP 1: CPT | Performed by: FAMILY MEDICINE

## 2018-07-17 PROCEDURE — 88305 TISSUE EXAM BY PATHOLOGIST: CPT | Performed by: FAMILY MEDICINE

## 2018-07-17 PROCEDURE — 88305 TISSUE EXAM BY PATHOLOGIST: CPT | Mod: 26 | Performed by: FAMILY MEDICINE

## 2018-07-17 RX ORDER — TRIAMCINOLONE ACETONIDE 1 MG/G
CREAM TOPICAL
Qty: 80 G | Refills: 1 | Status: SHIPPED | OUTPATIENT
Start: 2018-07-17 | End: 2018-09-11

## 2018-07-17 ASSESSMENT — PAIN SCALES - GENERAL: PAINLEVEL: NO PAIN (0)

## 2018-07-17 NOTE — LETTER
[address]  [address]  [phone]    July 23, 2018      Teddy Horner  [address]  [facility] [address]      Dear Teddy,    Flo you will find a copy of your recent biopsy report.   Your biopsy results indicate inflammatory cells and reaction in the skin consistent with a contact or allergic dermatitis.  Occasionally this can be seen as a reaction to medications.  I do not believe from our conversation that you have started any new medications near the time of the rash starting.     I would recommend use of zyrtec twice a day as you have been.  Topical treatment of the triamcinolone for individual areas.  I am sending a new script for Tacrolimus topically to the areas twice a day for up to 6 weeks.  Follow up at the end of this treatment or sooner if worsening symptoms or failure of the symptoms to gradually improve is noted.   Please call or MyChart message me if you have any questions.     Sincerely,         Whit Casiano MD

## 2018-07-17 NOTE — MR AVS SNAPSHOT
After Visit Summary   7/17/2018    Teddy Horner    MRN: [id number]           Patient Information     Date Of Birth          1935        Visit Information        Provider Department      7/17/2018 10:00 AM Whit Casiano MD; BA PROCEDURE ROOM [facility]        Today's Diagnoses     Rash    -  1      Care Instructions    Apply topical neosporin to area of biopsy today two times daily. Keeping area open to air recommended, unless you may be in an area that is susceptible for infection to area.       Refill of the triamcinolone cream to use twice a day to area of rash/itching.          Follow-ups after your visit        Who to contact     If you have questions or need follow up information about today's clinic visit or your schedule please contact [facility] directly at [phone].  Normal or non-critical lab and imaging results will be communicated to you by MyChart, letter or phone within 4 business days after the clinic has received the results. If you do not hear from us within 7 days, please contact the clinic through MyChart or phone. If you have a critical or abnormal lab result, we will notify you by phone as soon as possible.  Submit refill requests through RatherGather or call your pharmacy and they will forward the refill request to us. Please allow 3 business days for your refill to be completed.          Additional Information About Your Visit        Care EveryWhere ID     This is your Care EveryWhere ID. This could be used by other organizations to access your [place] medical records  [id number]        Your Vitals Were     Pulse Temperature Pulse Oximetry BMI (Body Mass Index)          82 97.5  F (36.4  C) (Oral) 98% 27.37 kg/m2         Blood Pressure from Last 3 Encounters:   07/17/18 96/58   07/12/18 126/80   07/05/18 90/70    Weight from Last 3 Encounters:   07/17/18 81.6 kg (180 lb)   07/12/18 80.7 kg (178 lb)   07/05/18 80.3 kg (177 lb)               Today, you had the following     No orders found for display         Today's Medication Changes          These changes are accurate as of 7/17/18 10:59 AM.  If you have any questions, ask your nurse or doctor.               Start taking these medicines.        Dose/Directions    triamcinolone 0.1 % cream   Commonly known as:  KENALOG   Used for:  Rash   Started by:  Whit Casiano MD        Apply sparingly to affected area twice times daily for 14 days.   Quantity:  80 g   Refills:  1            Where to get your medicines      These medications were sent to [facility] [address] [facility] [address] [facility] [address]  [address] [facility] [address], [facility] [address]     Phone:  [phone]     triamcinolone 0.1 % cream                Primary Care Provider Office Phone # Fax #    Whit Casiano [phone] [address] [facility] [address]  [facility] [address]        Equal Access to Services     [facility]: Hadii aad ku hadasho Soomaali, waaxda luqadaha, qaybta kaalmada adeegyada, waxay idiin hayaan antonette kharaguanaco mathisn . So [facility] [phone].    ATENCIÓN: Si habla español, tiene a knutson disposición servicios gratuitos de asistencia lingüística. Victoriano al [phone].    We comply with applicable federal civil rights laws and [place] laws. We do not discriminate on the basis of race, color, national origin, age, disability, sex, sexual orientation, or gender identity.            Thank you!     Thank you for choosing [facility]  for your care. Our goal is always to provide you with excellent care. Hearing back from our patients is one way we can continue to improve our services. Please take a few minutes to complete the written survey that you may receive in the mail after your visit with us. Thank you!             Your Updated Medication List - Protect others around you: Learn how to safely use, store and throw away your medicines at www.disposemymeds.org.          This list  is accurate as of 7/17/18 10:59 AM.  Always use your most recent med list.                   Brand Name Dispense Instructions for use Diagnosis    amitriptyline 10 MG tablet    ELAVIL    30 tablet    Take 1 tablet (10 mg) by mouth At Bedtime    Itching       amLODIPine 2.5 MG tablet    NORVASC     Take 2.5 mg by mouth 2 times daily        aspirin 81 MG tablet      Take 81 mg by mouth daily        atorvastatin 10 MG tablet    LIPITOR    30 tablet    Take 0.5 tablets (5 mg) by mouth daily        BETAPACE 80 MG tablet   Generic drug:  sotalol      Take 80 mg by mouth 2 times daily        * CENTRUM SILVER per tablet      Take 1 tablet by mouth daily        * OCUVITE PO           cetirizine 10 MG tablet    zyrTEC    60 tablet    Take 1 tablet (10 mg) by mouth 2 times daily    Pruritic rash       FLOMAX 0.4 MG capsule   Generic drug:  tamsulosin      Take 0.4 mg by mouth daily        fluticasone 50 MCG/ACT spray    FLONASE    1 Bottle    Spray 1-2 sprays into both nostrils daily    Congestion of paranasal sinus       gabapentin 100 MG capsule    NEURONTIN    30 capsule    Take 1 capsule (100 mg) by mouth At Bedtime    Bilateral arm numbness and tingling while sleeping, DDD (degenerative disc disease), cervical, Neuropathic pain, arm       levothyroxine 100 MCG tablet    SYNTHROID/LEVOTHROID    90 tablet    Take 1 tablet (100 mcg) by mouth daily        magnesium oxide 400 MG Caps      Take 400 mg by mouth 2 times daily        PANTOPRAZOLE SODIUM PO      Take 40 mg by mouth every evening        PLAVIX 75 MG tablet   Generic drug:  clopidogrel      Take 75 mg by mouth daily        sulindac 200 MG tablet    CLINORIL     Take 200 mg by mouth 2 times daily        triamcinolone 0.1 % cream    KENALOG    80 g    Apply sparingly to affected area twice times daily for 14 days.    Rash       * Notice:  This list has 2 medication(s) that are the same as other medications prescribed for you. Read the directions carefully, and ask your  doctor or other care provider to review them with you.

## 2018-07-17 NOTE — TELEPHONE ENCOUNTER
Reason for Call:  Other prescription    Detailed comments: Patient is requesting Antibiotics for the extractions he had done this morning. Please send RX to [facility] [address] [facility] [address] [place] [address]    Phone Number Patient can be reached at: Cell number on file:    Telephone Information:   Mobile [phone]       Best Time: anytime     Can we leave a detailed message on this number? YES    Call taken on 7/17/2018 at 4:08 PM by Helen Acevedo

## 2018-07-17 NOTE — PROGRESS NOTES
SUBJECTIVE:   Teddy Horner is a 82 year old male who presents to clinic today for the following health issues:    Patient presents for skin biopsy procedure. Patient reported that skin symptoms of rash and irritation have worsened. He stated that when picking and removing pimple lesions, he experiences pain afterwards. He stated that he has been using Zyrtec twice a day  without significant improvement to symptoms. He believes he sleeps well.     He has an appointment scheduled at [facility] and with Dermatology for further evaluation in September.   Patient is taking plavix and asprin 81mg.     Rash is located on back and shoulder, and patient reports that it has been spreading.   It is pruritic.  He had triamcinolone from the dermatologist but needs a refill.  Treating areas individually.            Problem list and histories reviewed & adjusted, as indicated.  Additional history: as documented    Patient Active Problem List   Diagnosis     Ocular migraine     Coronary artery disease involving coronary bypass graft of native heart without angina pectoris     History of TIA (transient ischemic attack) and stroke     Acquired hypothyroidism     Neuropathic pain, arm     Bilateral arm numbness and tingling while sleeping     DDD (degenerative disc disease), cervical     Advanced directives, counseling/discussion     Squamous blepharitis of upper and lower eyelids of both eyes     Past Surgical History:   Procedure Laterality Date     AS CABG, VEIN, THREE  2012     CATARACT IOL, RT/LT Bilateral      HERNIORRHAPHY INGUINAL Left      LAPAROSCOPIC CHOLECYSTECTOMY         Social History   Substance Use Topics     Smoking status: Never Smoker     Smokeless tobacco: Never Used     Alcohol use Yes     Family History   Problem Relation Age of Onset     Alcoholism Daughter          Current Outpatient Prescriptions   Medication Sig Dispense Refill     amitriptyline (ELAVIL) 10 MG tablet Take 1 tablet (10 mg) by mouth At Bedtime 30  tablet 0     amLODIPine (NORVASC) 2.5 MG tablet Take 2.5 mg by mouth 2 times daily       aspirin 81 MG tablet Take 81 mg by mouth daily       atorvastatin (LIPITOR) 10 MG tablet Take 0.5 tablets (5 mg) by mouth daily 30 tablet 1     cetirizine (ZYRTEC) 10 MG tablet Take 1 tablet (10 mg) by mouth 2 times daily 60 tablet 1     clopidogrel (PLAVIX) 75 MG tablet Take 75 mg by mouth daily       gabapentin (NEURONTIN) 100 MG capsule Take 1 capsule (100 mg) by mouth At Bedtime 30 capsule 0     levothyroxine (SYNTHROID/LEVOTHROID) 100 MCG tablet Take 1 tablet (100 mcg) by mouth daily 90 tablet 1     magnesium oxide 400 MG CAPS Take 400 mg by mouth 2 times daily       Multiple Vitamins-Minerals (CENTRUM SILVER) per tablet Take 1 tablet by mouth daily       Multiple Vitamins-Minerals (OCUVITE PO)        PANTOPRAZOLE SODIUM PO Take 40 mg by mouth every evening       sotalol (BETAPACE) 80 MG tablet Take 80 mg by mouth 2 times daily       sulindac (CLINORIL) 200 MG tablet Take 200 mg by mouth 2 times daily       tamsulosin (FLOMAX) 0.4 MG capsule Take 0.4 mg by mouth daily       fluticasone (FLONASE) 50 MCG/ACT spray Spray 1-2 sprays into both nostrils daily (Patient not taking: Reported on 7/12/2018) 1 Bottle 3     Allergies   Allergen Reactions     Sulfa Drugs Hives     Recent Labs   Lab Test  07/05/18   1028 05/22/17   A1C   --   5.7*   LDL   --   64   HDL   --   47   TRIG   --   57   ALT  33   --    CR  0.99  1   GFRESTIMATED  72   --    GFRESTBLACK  87   --    POTASSIUM  4.2  4.3   TSH   --   0.9      BP Readings from Last 3 Encounters:   07/17/18 96/58   07/12/18 126/80   07/05/18 90/70    Wt Readings from Last 3 Encounters:   07/17/18 81.6 kg (180 lb)   07/12/18 80.7 kg (178 lb)   07/05/18 80.3 kg (177 lb)                  Labs reviewed in EPIC    Reviewed and updated as needed this visit by clinical staff  Tobacco  Allergies  Meds  Med Hx  Surg Hx  Fam Hx  Soc Hx      Reviewed and updated as needed this visit by  Provider  Tobacco  Allergies  Meds  Med Hx  Surg Hx  Fam Hx  Soc Hx        ROS:  Constitutional, HEENT, cardiovascular, pulmonary, GI, , musculoskeletal, neuro, skin, endocrine and psych systems are negative, except as otherwise noted.    This document serves as a record of the services and decisions personally performed and made by Whit Casiano MD. It was created on her behalf by Zachery Seth, a trained medical scribe. The creation of this document is based on the provider's statements to the medical scribe.  Zachery Seth July 17, 2018 10:22 AM      OBJECTIVE:     BP 96/58 (BP Location: Right arm, Patient Position: Chair, Cuff Size: Adult Regular)  Pulse 82  Temp 97.5  F (36.4  C) (Oral)  Wt 81.6 kg (180 lb)  SpO2 98%  BMI 27.37 kg/m2  Body mass index is 27.37 kg/(m^2).  GENERAL: healthy, alert and no distress  NECK: no adenopathy, no asymmetry, masses, or scars and thyroid normal to palpation  RESP: lungs clear to auscultation - no rales, rhonchi or wheezes  CV: regular rate and rhythm, normal S1 S2, no S3 or S4, no murmur, click or rub, no peripheral edema and peripheral pulses strong  SKIN: scattered erythematous papules of skin of arms, legs and trunk.  Excoriation noted.  No secondary infection noted.     PROCEDURE NOTE:      Subjective: Teddy Horner a 82 year old male who presents today for lesion removal. Diffuse scattered lesions/areas - 2 representative lesions chosen for biopsy.  The lesion(s) is/are located on the right upper back, left anterior chest, number 2 and measures 0.4cm each  The patient reports the lesion is itching and denies other significant symptoms on ROS. Medications reviewed.    Pause for the cause has been completed prior to the prceedure.   1. Teddy was identified by both name and date of birth   2. The correct site was identified   3. Site was marked by provider    4. Written informed consent correct and signed or verbal authorization  to proceed was obtained    5. Verifed necessary supplies, equipment, and diagnostics are available    6. Time out was performed immediately prior to procedure    Objective: The lesion(s) is/are of the above mentioned size and location and is/are erythematous and papular. The area was prepped and appropriately anesthetized. Using the usual technique, punch biopsy size 3 mm and chemical cautery was performed to each area and antibiotic ointment and appropriate dressing was applied. The procedure was well tolerated and without complications.     Assessment: persistent pruritic rash - cause undetermined.      Plan: Follow up: The specimen is labelled and sent to pathology for evaluation., The patient may return prn.. Wound care instructions provided.  Patient was instructed to be alert for any signs of cutaneous infection.          ASSESSMENT/PLAN:       1. Rash  Apply antibiotic ointment to biopsy sites and triamcinolone to other areas of rash.  - triamcinolone (KENALOG) 0.1 % cream; Apply sparingly to affected area twice times daily for 14 days.  Dispense: 80 g; Refill: 1  - Single Lesion  - Each ADDITIONAL Lesion (right click to enter quantity)  - Surgical pathology exam    Patient Instructions   Apply topical neosporin to area of biopsy today two times daily. Keeping area open to air recommended, unless you may be in an area that is susceptible for infection to area.       Refill of the triamcinolone cream to use twice a day to area of rash/itching.      Whit Casiano MD  [facility]

## 2018-07-17 NOTE — PATIENT INSTRUCTIONS
Apply topical neosporin to area of biopsy today two times daily. Keeping area open to air recommended, unless you may be in an area that is susceptible for infection to area.       Refill of the triamcinolone cream to use twice a day to area of rash/itching.

## 2018-07-18 NOTE — TELEPHONE ENCOUNTER
Please call patient - he should put antibiotic ointment on the areas.  He said he had neosporin at home.  He does not need oral antibiotics for these biopsies.    HAKEEM

## 2018-07-18 NOTE — TELEPHONE ENCOUNTER
This writer attempted to contact Teddy on 07/18/18      Reason for call provider message and left detailed message.      If patient calls back:   Patient contacted by a Registered Nurse. Inform patient that someone from the RN group will contact them, document that pt called and route to P DYAD 3 RN POOL [[id number]]    Jeffery Roman RN, BSN

## 2018-07-19 NOTE — TELEPHONE ENCOUNTER
Called and spoke with patient. Clarified that oral Abx's are not needed for the biopsies that were taken and that should just be applying Abx ointment, like Neosporin, to sites. Pt understanding and has been applying the Neosporin as directed by provider. Pt reports that areas look well and are healing. No concerns noted. RN agreed and advised to continue care plan as discussed by provider in OV. Pt agreed. No further questions at this time.    Jeana Banuelos RN  [facility]

## 2018-07-20 LAB — COPATH REPORT: NORMAL

## 2018-07-22 ENCOUNTER — TELEPHONE (OUTPATIENT)
Dept: FAMILY MEDICINE | Facility: CLINIC | Age: 83
End: 2018-07-22

## 2018-07-22 DIAGNOSIS — L29.9 ITCHING: ICD-10-CM

## 2018-07-22 RX ORDER — TACROLIMUS 1 MG/G
OINTMENT TOPICAL 2 TIMES DAILY
Qty: 60 G | Refills: 0 | Status: SHIPPED | OUTPATIENT
Start: 2018-07-22 | End: 2018-09-11

## 2018-07-22 NOTE — TELEPHONE ENCOUNTER
Please call patient  - see letter below outlining recommendations.  Verify no new medications since just prior to rash starting.    New script sent in for him.   PSK              Attached you will find a copy of your recent biopsy report.  Your biopsy results indicate inflammatory cells and reaction in the skin consistent with a contact or allergic dermatitis.  Occasionally this can be seen as a reaction to medications.  I do not believe from our conversation that you have started any new medications near the time of the rash starting.    I would recommend use of zyrtec twice a day as you have been.  Topical treatment of the triamcinolone for individual areas.  I am sending a new script for Tacrolimus topically to the areas twice a day for up to 6 weeks.  Follow up at the end of this treatment or sooner if worsening symptoms or failure of the symptoms to gradually improve is noted.  Please call or MyChart message me if you have any questions.    Sincerely,         Whit Casiano MD

## 2018-07-22 NOTE — PROGRESS NOTES
Please print letter and attach results.  PSK        Attached you will find a copy of your recent biopsy report.  Your biopsy results indicate inflammatory cells and reaction in the skin consistent with a contact or allergic dermatitis.  Occasionally this can be seen as a reaction to medications.  I do not believe from our conversation that you have started any new medications near the time of the rash starting.    I would recommend use of zyrtec twice a day as you have been.  Topical treatment of the triamcinolone for individual areas.  I am sending a new script for Tacrolimus topically to the areas twice a day for up to 6 weeks.  Follow up at the end of this treatment or sooner if worsening symptoms or failure of the symptoms to gradually improve is noted.  Please call or MyChart message me if you have any questions.    Sincerely,         Whit Casiano MD

## 2018-07-23 NOTE — TELEPHONE ENCOUNTER
Please call patient re:  Did he start the amitriptyline after the recent allergy appointment?  If so he can continue taking it.  If he started it before rash - have him stop it and let me know.     HERACLIOK

## 2018-07-23 NOTE — TELEPHONE ENCOUNTER
Informed patient ok to continue medication since he started it after recent appointment (per Dr. Casiano's recommendations)    Pt is requesting new refill of medication - pt did indicate he did have about 15 tablets left, but since on the phone wanted to see if we could get it done for him now. Routing to Dr. Casiano to review refill request.    Dontae Freedman MA

## 2018-07-23 NOTE — TELEPHONE ENCOUNTER
...Reason for Call:  Other     Detailed comments: Patient called back he said he started the medication after the rash.    Phone Number Patient can be reached at: Home number on file [phone] (home)    Best Time: anytime    Can we leave a detailed message on this number? YES    Call taken on 7/23/2018 at 4:21 PM by Luisana Cameron

## 2018-07-23 NOTE — TELEPHONE ENCOUNTER
Called and spoke with patient. He states understanding. At this time he does state there has been some improvement with symptoms. Patient will  new Rx topical Tacrolimus today. Patient did want to mention to provider that he has also been taking Amitriptyline as prescribed, and wants to know if this is okay to continue with everything else he is now taking?    Corinna Tan RN

## 2018-07-23 NOTE — TELEPHONE ENCOUNTER
This writer attempted to contact Teddy on 07/23/18      Reason for call pt had medication question and left message.      If patient calls back:   Ask if patient started taking Amitriptyline before or after rash occurred?, (read verbatim), document that pt called and route to Stephenie.        Gladis Mahan MA

## 2018-07-24 ENCOUNTER — TELEPHONE (OUTPATIENT)
Dept: FAMILY MEDICINE | Facility: CLINIC | Age: 83
End: 2018-07-24

## 2018-07-24 RX ORDER — AMITRIPTYLINE HYDROCHLORIDE 10 MG/1
10 TABLET ORAL AT BEDTIME
Qty: 30 TABLET | Refills: 1 | Status: SHIPPED | OUTPATIENT
Start: 2018-07-24 | End: 2019-08-13

## 2018-07-24 NOTE — TELEPHONE ENCOUNTER
Team please contact patient and gather more information. What ointments is she referring to?  Then route to appropriate pool.  Omaira Pinon RN

## 2018-07-24 NOTE — TELEPHONE ENCOUNTER
Spoke with pt and he has Triamcinolone and tacrolimus. Both are for the same thing. Wondering if he should finish the  Triamcinolone he has 3 days left and then start the Tacrolimus?      Alvina Card MA

## 2018-07-24 NOTE — TELEPHONE ENCOUNTER
Reason for Call:  Other prescription    Detailed comments: patient wants to know if he should use both ointments at the same time.  Phone cut out and could and disconnected and could not get anymore info.    Phone Number Patient can be reached at: Home number on file [phone] (home)    Best Time: any    Can we leave a detailed message on this number? YES    Call taken on 7/24/2018 at 11:15 AM by Mayra Delgado

## 2018-07-30 NOTE — TELEPHONE ENCOUNTER
Patient finished the triamcinolone and started the tacrolimus,   but the tacrolimus gave him a bad itch    Can he [facility] a [facility] [address] [phone]    And should he continue with the same dosing amount as  He was seeing improvement with it    Can you call to confirm directions and ok to leave message [phone]

## 2018-07-31 NOTE — TELEPHONE ENCOUNTER
Informed pt of message below. He had not picked up the triamcinolone cream. He will call to get the fill ready      Alvina Card MA

## 2018-08-03 ENCOUNTER — TELEPHONE (OUTPATIENT)
Dept: FAMILY MEDICINE | Facility: CLINIC | Age: 83
End: 2018-08-03

## 2018-08-03 NOTE — TELEPHONE ENCOUNTER
Reason for call:  Other   Patient called regarding (reason for call): biopsy  Additional comments: Per patient's request, he would like someone from Dr. Casiano's care team to fax a copy of his biopsy report to [facility]:    [facility] #[id number]  Fax: [phone]  Attn: David     Please call to confirm once this has been completed. They would like to have this done as soon as possible.    Phone number to reach patient:  Home number on file [phone] (home)    Best Time:  Any time    Can we leave a detailed message on this number?  YES     Aleah RATLIFF  Central Scheduler

## 2018-08-09 NOTE — TELEPHONE ENCOUNTER
Reason for Call:  Other Fax    Detailed comments: Pt calling to follow up on Biopsy report and has a question as to whether he needs to sign a ROSE to have these sent and would like a call back.    Phone Number Patient can be reached at: Home number on file [phone] (home)    Best Time: anytime    Can we leave a detailed message on this number? YES    Call taken on 8/9/2018 at 1:33 PM by Nick Krishna

## 2018-08-09 NOTE — TELEPHONE ENCOUNTER
Lab spoke with [facility] lab and the care team will need to have the  patient fill out an ROSE and have it faxed to the pathology lab at [phone]    Pippa Dee CMA

## 2018-08-09 NOTE — TELEPHONE ENCOUNTER
Spoke with pt, pt states [facility] wants actual copies of the slides from the biopsy.  I will contact lab in regards to this.    Deneen REED, Patient Care

## 2018-08-10 ENCOUNTER — TELEPHONE (OUTPATIENT)
Dept: FAMILY MEDICINE | Facility: CLINIC | Age: 83
End: 2018-08-10

## 2018-08-10 NOTE — TELEPHONE ENCOUNTER
..Reason for Call:  forms    Detailed comments: Patient dropped off ROSE form today and is asking if it can be faxed to [facility] lab today;     Phone Number Patient can be reached at: Home number on file [phone] (home)    Best Time: anytime    Can we leave a detailed message on this number? YES    Call taken on 8/10/2018 at 2:44 PM by Maryana Quinonez

## 2018-08-17 ENCOUNTER — TELEPHONE (OUTPATIENT)
Dept: FAMILY MEDICINE | Facility: CLINIC | Age: 83
End: 2018-08-17

## 2018-08-17 NOTE — TELEPHONE ENCOUNTER
Reason for Call:  Other Fax    Detailed comments: Pt would like his recent lab results faxed  to Dr. Johnson  to fax #  [phone] attn: Yenifer . He would like a call back to confirm fax has been sent.    Phone Number Patient can be reached at: Home number on file [phone] (home)    Best Time: anytime    Can we leave a detailed message on this number? YES    Call taken on 8/17/2018 at 1:19 PM by Nick Krishna

## 2018-08-21 ENCOUNTER — TELEPHONE (OUTPATIENT)
Dept: FAMILY MEDICINE | Facility: CLINIC | Age: 83
End: 2018-08-21

## 2018-08-21 NOTE — TELEPHONE ENCOUNTER
Reason for Call:  Request for results:    Name of test or procedure: Needs last blood work test results mailed to his home.    Date of test of procedure: Was not sure of the date.    Location of the test or procedure: [place]    OK to leave the result message on voice mail or with a family member? YES    Phone number Patient can be reached at:  Home number on file [phone] (home)    Additional comments: Please mail these results to his home.    Call taken on 8/21/2018 at 9:28 AM by Jelena Pineda

## 2018-08-30 ENCOUNTER — TELEPHONE (OUTPATIENT)
Dept: FAMILY MEDICINE | Facility: CLINIC | Age: 83
End: 2018-08-30

## 2018-08-30 DIAGNOSIS — L20.9 ATOPIC DERMATITIS, UNSPECIFIED TYPE: ICD-10-CM

## 2018-08-30 NOTE — TELEPHONE ENCOUNTER
This writer attempted to contact Teddy on 08/30/18      Reason for call triage itching/discuss referral and left message.      If patient calls back:   Patient contacted by a Registered Nurse. Inform patient that someone from the RN group will contact them, document that pt called and route to P DYAD 3 RN POOL [[id number]]      Corinna Tan RN    Per last OV on 7/17/18 where rash was addressed (FYI):

## 2018-08-30 NOTE — TELEPHONE ENCOUNTER
He was given the Protopic after results were obtained on the biopsy.  He was to use for 6 weeks.  Is he still taking this?  If not, how long did he use it?    I will place order for dermatology - copied below.  2 options given.  \[facility] [place] [facility]\""       DERMATOLOGY REFERRAL [[id number]]   Electronically signed by: Whit Casiano MD on 08/30/18 1700 Status: Active   Ordering user: Whit Casiano MD 08/30/18 1700           Order History   Outpatient   Date/Time Action Taken User Additional Information   08/30/18 1700 Sign Whit Casiano MD    Comments   Your provider has referred you to: [facility] [phone]   [email or web address]  [facility] [place] [phone]   [email or web address]    Please be aware that coverage of these services is subject to the terms and limitations of your health insurance plan.  Call member services at your health plan with any benefit or coverage questions.      Please bring the following with you to your appointment:    (1) Any X-Rays, CTs or MRIs which have been performed.  Contact the facility where they were done to arrange for  prior to your scheduled appointment.    (2) List of current medications  (3) This referral request   (4) Any documents/labs given to you for this referral   Associated Diagnoses   Atopic dermatitis, unspecified type [L20.9]

## 2018-08-30 NOTE — TELEPHONE ENCOUNTER
Called and spoke with patient. He states that his rash has not gotten any better since last OV in July with Dr. Casiano and then his appointment he was referred to with Allergy. Patient states that the tx he was given there was somewhat helpful but that his rash/itchiness still persists. He denies any new symptoms, but that it has been persistent. Patient is requesting a referral to a dermatologist, it does not need to be within [place], he is okay with it being sent anywhere, he does not have a particular facility in mind. He does state he does not want to go back to the Allergy provider or facility that he was last seen in.     Patient also does state at the end of September he has an appointment at [facility] for evaluation - but patient would like to also see someone here sooner.     Routing to provider to review and advise.     Corinna Tan RN

## 2018-08-30 NOTE — TELEPHONE ENCOUNTER
Reason for Call:  Other Referral    Detailed comments: Pt calling for he is not getting any better for his itch still persists and would like a call back to advise further. He would like a new Dermatology Referral within or even outside of [place] for further evaluation.      Phone Number Patient can be reached at: Home number on file [phone] (home)    Best Time: anytime    Can we leave a detailed message on this number? YES    Call taken on 8/30/2018 at 3:07 PM by Nick Krishna

## 2018-08-30 NOTE — TELEPHONE ENCOUNTER
returned call    Best number to reach caller: Home number on file [phone] (home)    Is it ok to leave a detailed message: YES

## 2018-08-31 ENCOUNTER — TELEPHONE (OUTPATIENT)
Dept: FAMILY MEDICINE | Facility: CLINIC | Age: 83
End: 2018-08-31

## 2018-08-31 DIAGNOSIS — L28.2 PRURITIC RASH: ICD-10-CM

## 2018-08-31 RX ORDER — CETIRIZINE HYDROCHLORIDE 10 MG/1
10 TABLET ORAL 2 TIMES DAILY
Qty: 60 TABLET | Refills: 1 | Status: SHIPPED | OUTPATIENT
Start: 2018-08-31 | End: 2018-10-28

## 2018-08-31 NOTE — TELEPHONE ENCOUNTER
He saw the allergist in July.  They recommended dermatologist.   Is he requesting a second opinion or back to same allergist?    HAKEEM

## 2018-08-31 NOTE — TELEPHONE ENCOUNTER
Spoke with pt and he does not want the same allergist.     Pt Has appt 9-27 at [facility] so he will follow up with them regarding this issue.       Informed rx for cetirizine was sent.       Alvina Card MA

## 2018-08-31 NOTE — TELEPHONE ENCOUNTER
Spoke with pt and he states he Used all the protonix.     Gave him both derm numbers.      Alvina Card MA

## 2018-08-31 NOTE — TELEPHONE ENCOUNTER
...Reason for Call:  Other     Detailed comments: Patient said he need a referral for an allergist he said he cant get in to the dermatologist until October or November.    Phone Number Patient can be reached at: Home number on file [phone] (home)    Best Time: anytime    Can we leave a detailed message on this number? YES    Call taken on 8/31/2018 at 9:07 AM by Luisana Cameron

## 2018-08-31 NOTE — TELEPHONE ENCOUNTER
Reason for Call:  Other appointment    Detailed comments: Pt calling to notify Dr. Casiano that he has decided he will go to the Sept 27[facility] apt at the [facility] in regards to his dermatology concerns .    Phone Number Patient can be reached at: Home number on file [phone] (home)    Best Time: anytime    Can we leave a detailed message on this number? YES    Call taken on 8/31/2018 at 9:02 AM by Nick Krishna

## 2018-09-10 ENCOUNTER — TELEPHONE (OUTPATIENT)
Dept: FAMILY MEDICINE | Facility: CLINIC | Age: 83
End: 2018-09-10

## 2018-09-10 NOTE — TELEPHONE ENCOUNTER
Teddy Horner is a 82 year old male who calls with nasal congestion.    NURSING ASSESSMENT:  Description:  Patient stated he has had nasal congestion for 3 weeks.  Onset/duration:  3 weeks  Precip. factors:  Patient experienced symptoms like this over 1 year ago.  Associated symptoms: Nasal congestion, when blowing nose there is green/yellow mucus, sometimes minimal blood if he blows nose often. Denies heavy bleeding or any nosebleed that is persistent.  Denies SOB, chest pain, abdominal pain, confusion, difficulty breathing, fever, weakness.  Improves/worsens symptoms:  Nothing - patient denies trying anything OTC.  Pain scale (0-10)   0/10  LMP/preg/breast feeding:  N/A  Last exam/Treatment: July 2018  Allergies:   Allergies   Allergen Reactions     Sulfa Drugs Hives       MEDICATIONS:   Patient denies trying any OTC medications for symptoms. Patient stated he experienced similar symptoms last year and that provider prescribed something for him. Patient does not recall what the medication was, writer looked back at OV from July 2017 and patient had been prescribed Amoxicillin due to a diagnosis of acute sinusitis. Patient requesting that provider prescribe this Rx again for patient. Writer advised patient that he must be seen and evaluated with provider before any further medications can be prescribed, patient states understanding.       NURSING PLAN: Nursing advice to patient is to be seen in clinic for symptoms before anything will be prescribed.    RECOMMENDED DISPOSITION:  See in 24 hours - Patient is scheduled to see provider tomorrow AM for symptoms.   Patient advised patient that if he experiences any emergent symptoms such as: SOB, chest pain, abdominal pain, confusion, difficulty breathing, fever, weakness, worsening of bleeding/new or persistent bloody nose, to call 911 to be evaluated in the ER, he states understanding.  Will comply with recommendation: Yes  If further questions/concerns or if symptoms  do not improve, worsen or new symptoms develop, call your PCP or [facility] as soon as possible.      Guideline used:  Telephone Triage Protocols for Nurses, Fifth Edition, Sandra Tan RN

## 2018-09-10 NOTE — TELEPHONE ENCOUNTER
Reason for call:  Patient reporting a symptom    Symptom or request: nasal Congestion     Duration (how long have symptoms been present): 3 weeks    Have you been treated for this before? No    Additional comments: patient would like a RX called in for this. [facility] [employer] [address] [place], [place] [id number] [place] [address]    Phone Number patient can be reached at:  Home number on file [phone] (home)    Best Time:  Anytime     Can we leave a detailed message on this number:  YES    Call taken on 9/10/2018 at 8:16 AM by Helen Acevedo

## 2018-09-11 ENCOUNTER — OFFICE VISIT (OUTPATIENT)
Dept: FAMILY MEDICINE | Facility: CLINIC | Age: 83
End: 2018-09-11
Payer: MEDICARE

## 2018-09-11 VITALS
OXYGEN SATURATION: 99 % | BODY MASS INDEX: 27.06 KG/M2 | TEMPERATURE: 97.6 F | SYSTOLIC BLOOD PRESSURE: 120 MMHG | WEIGHT: 178 LBS | HEART RATE: 80 BPM | DIASTOLIC BLOOD PRESSURE: 74 MMHG

## 2018-09-11 DIAGNOSIS — I25.810 CORONARY ARTERY DISEASE INVOLVING CORONARY BYPASS GRAFT OF NATIVE HEART WITHOUT ANGINA PECTORIS: ICD-10-CM

## 2018-09-11 DIAGNOSIS — R73.9 HYPERGLYCEMIA: ICD-10-CM

## 2018-09-11 DIAGNOSIS — J01.90 ACUTE SINUSITIS WITH SYMPTOMS > 10 DAYS: Primary | ICD-10-CM

## 2018-09-11 DIAGNOSIS — I48.0 PAROXYSMAL ATRIAL FIBRILLATION (H): ICD-10-CM

## 2018-09-11 DIAGNOSIS — Z86.73 HISTORY OF TIA (TRANSIENT ISCHEMIC ATTACK) AND STROKE: ICD-10-CM

## 2018-09-11 DIAGNOSIS — E03.9 ACQUIRED HYPOTHYROIDISM: ICD-10-CM

## 2018-09-11 PROCEDURE — 99214 OFFICE O/P EST MOD 30 MIN: CPT | Performed by: FAMILY MEDICINE

## 2018-09-11 RX ORDER — AMOXICILLIN 875 MG
875 TABLET ORAL 2 TIMES DAILY
Qty: 20 TABLET | Refills: 0 | Status: SHIPPED | OUTPATIENT
Start: 2018-09-11 | End: 2019-06-10

## 2018-09-11 ASSESSMENT — PAIN SCALES - GENERAL: PAINLEVEL: NO PAIN (0)

## 2018-09-11 NOTE — PROGRESS NOTES
"  SUBJECTIVE:   Teddy Horner is a 82 year old male who presents to clinic today for the following health issues:      Acute Illness   Acute illness concerns: URI  Onset: 3 weeks    Fever: no     Chills/Sweats: no     Headache (location?): no     Sinus Pressure:no    Conjunctivitis:  YES: left    Ear Pain: no    Rhinorrhea: YES    Congestion: YES- just in nose    Sore Throat: No     Cough: no but pt is having sputum yellow/green in the morning and is noted to be thick and hard to move.     Wheeze: no     Decreased Appetite: no     Nausea: no     Vomiting: no     Diarrhea:  no     Dysuria/Freq.: no     Fatigue/Achiness: no     Sick/Strep Exposure: no      Patient is also itchy all over body  Therapies Tried and outcome: None  He noted that he was diagnosed with sinus infection in the past, July 2017. Current symptoms are noted as above and notes that he is experiencing  Mucous drainage of left eye in the morning and blood in the nose. Blood in the nose was only noted to occur and attributed to \"sticking finger\" in his nose.  Symptoms has been going on for couple weeks now.       Elevated Blood Pressure   He takes norvasc one tablet in the morning and one tablet in the evening if blood pressure is above 120/80 that day.     Atrial Fib- paroxysmal, History of TIA (transient ischemic attack) and stroke and Coronary artery disease involving coronary bypass graft of native heart without angina pectoris  He noted that his Sotalol is prescribed by  physician from [place] and has been taking for couple years.  Tolerating chronic medication well.  Will follow up with cardiology on return to [place] in October    Rash  He reports that symptoms have gone from from bad to worse. He reports that now he is experiencing rash all over his chest that are described as spots and are itchy. He stated that he has not yet followed up with allergist due to no available appointments.   Using Cerave prior to bedtime to help control \"calms down\" " symptoms to be able to sleep.   Patient is worried that rash is caused by Hyperglycemia.   OF NOTE He stated that he has an appointment at [facility] on the 27th. Protopic helped a little bit. He also has an appointment later this week in [place] with allergist.         Problem list and histories reviewed & adjusted, as indicated.  Additional history: as documented    Patient Active Problem List   Diagnosis     Ocular migraine     Coronary artery disease involving coronary bypass graft of native heart without angina pectoris     History of TIA (transient ischemic attack) and stroke     Acquired hypothyroidism     Neuropathic pain, arm     Bilateral arm numbness and tingling while sleeping     DDD (degenerative disc disease), cervical     Advanced directives, counseling/discussion     Squamous blepharitis of upper and lower eyelids of both eyes     Past Surgical History:   Procedure Laterality Date     AS CABG, VEIN, THREE  2012     CATARACT IOL, RT/LT Bilateral      HERNIORRHAPHY INGUINAL Left      LAPAROSCOPIC CHOLECYSTECTOMY         Social History   Substance Use Topics     Smoking status: Never Smoker     Smokeless tobacco: Never Used     Alcohol use Yes     Family History   Problem Relation Age of Onset     Alcoholism Daughter          Current Outpatient Prescriptions   Medication Sig Dispense Refill     amitriptyline (ELAVIL) 10 MG tablet Take 1 tablet (10 mg) by mouth At Bedtime 30 tablet 1     amLODIPine (NORVASC) 2.5 MG tablet Take 2.5 mg by mouth 2 times daily       aspirin 81 MG tablet Take 81 mg by mouth daily       atorvastatin (LIPITOR) 10 MG tablet Take 0.5 tablets (5 mg) by mouth daily 30 tablet 1     cetirizine (ZYRTEC) 10 MG tablet Take 1 tablet (10 mg) by mouth 2 times daily 60 tablet 1     clopidogrel (PLAVIX) 75 MG tablet Take 75 mg by mouth daily       levothyroxine (SYNTHROID/LEVOTHROID) 100 MCG tablet Take 1 tablet (100 mcg) by mouth daily 90 tablet 1     magnesium oxide 400 MG CAPS Take 400 mg by  mouth 2 times daily       Multiple Vitamins-Minerals (CENTRUM SILVER) per tablet Take 1 tablet by mouth daily       Multiple Vitamins-Minerals (OCUVITE PO)        PANTOPRAZOLE SODIUM PO Take 40 mg by mouth every evening       sotalol (BETAPACE) 80 MG tablet Take 80 mg by mouth 2 times daily       sulindac (CLINORIL) 200 MG tablet Take 200 mg by mouth 2 times daily       tamsulosin (FLOMAX) 0.4 MG capsule Take 0.4 mg by mouth daily       gabapentin (NEURONTIN) 100 MG capsule Take 1 capsule (100 mg) by mouth At Bedtime (Patient not taking: Reported on 9/11/2018) 30 capsule 0     Allergies   Allergen Reactions     Sulfa Drugs Hives     Recent Labs   Lab Test  07/05/18   1028 05/22/17   A1C   --   5.7*   LDL   --   64   HDL   --   47   TRIG   --   57   ALT  33   --    CR  0.99  1   GFRESTIMATED  72   --    GFRESTBLACK  87   --    POTASSIUM  4.2  4.3   TSH   --   0.9      BP Readings from Last 3 Encounters:   09/11/18 120/74   07/17/18 96/58   07/12/18 126/80    Wt Readings from Last 3 Encounters:   09/11/18 80.7 kg (178 lb)   07/17/18 81.6 kg (180 lb)   07/12/18 80.7 kg (178 lb)                  Labs reviewed in EPIC    Reviewed and updated as needed this visit by clinical staff  Tobacco  Allergies  Meds  Med Hx  Surg Hx  Fam Hx  Soc Hx      Reviewed and updated as needed this visit by Provider  Tobacco  Allergies  Meds  Med Hx  Surg Hx  Fam Hx  Soc Hx        ROS:  Constitutional, HEENT, cardiovascular, pulmonary, GI, , musculoskeletal, neuro, skin, endocrine and psych systems are negative, except as otherwise noted.    This document serves as a record of the services and decisions personally performed and made by Whit Casiano MD. It was created on her behalf by Zachery Seth, a trained medical scribe. The creation of this document is based on the provider's statements to the medical scribe.  Zachery Seth September 11, 2018 9:17 AM      OBJECTIVE:     /74 (BP Location: Right arm, Patient Position:  "Chair, Cuff Size: Adult Regular)  Pulse 80  Temp 97.6  F (36.4  C) (Oral)  Wt 80.7 kg (178 lb)  SpO2 99%  BMI 27.06 kg/m2  Body mass index is 27.06 kg/(m^2).  GENERAL: healthy, alert and no distress  EYES: Eyes grossly normal to inspection, PERRL and conjunctivae and sclerae normal  HENT: normal cephalic/atraumatic, ear canals and TM's normal, nose and mouth without ulcers or lesions, oropharynx clear, oral mucous membranes moist and nasal mucosa edematous and post nasal drip.    NECK: no adenopathy, no asymmetry, masses, or scars and thyroid normal to palpation  RESP: lungs clear to auscultation - no rales, rhonchi or wheezes  CV: regular rate and rhythm, normal S1 S2, no S3 or S4, no murmur, click or rub, no peripheral edema and peripheral pulses strong  MS: no gross musculoskeletal defects noted, no edema  SKIN: no suspicious lesions or rashes and scattered red excoriations and papular rash across the chest, back and upper extremities.   NEURO: Normal strength and tone, mentation intact and speech normal  PSYCH: mentation appears normal, affect normal/bright  Edematous nasal mucosa.         ASSESSMENT/PLAN:       Blood pressures: He reports that he takes norvasc one tablet in the morning and one tablet in the evening if blood pressure is above 120/80 that day.  Tobacco Cessation:   reports that he has never smoked. He has never used smokeless tobacco.      BMI:   Estimated body mass index is 27.06 kg/(m^2) as calculated from the following:    Height as of 7/5/18: 1.727 m (5' 8\").    Weight as of this encounter: 80.7 kg (178 lb).         1. Acute sinusitis with symptoms > 10 days  Patient will begin Amoxicillin 875 mg twice a day for 10 days. Mucinex 600 mg 1 tablets once daily recommended.   - amoxicillin (AMOXIL) 875 MG tablet; Take 1 tablet (875 mg) by mouth 2 times daily  Dispense: 20 tablet; Refill: 0    2. Acquired hypothyroidism  Future orders made, patient will return to clinic for fasting labs. He " will continue to take levothyroxine as directed.    - **TSH with free T4 reflex FUTURE anytime; Future    3. Hyperglycemia  Future orders made, patient will return to clinic for fasting labs.   - **Comprehensive metabolic panel FUTURE anytime; Future  - **A1C FUTURE anytime; Future    4. Paroxysmal atrial fibrillation (H)/ 6. History of TIA (transient ischemic attack) and stroke  - **Comprehensive metabolic panel FUTURE anytime; Future  - **A1C FUTURE anytime; Future  - **TSH with free T4 reflex FUTURE anytime; Future    5. Coronary artery disease involving coronary bypass graft of native heart without angina pectoris  - **Comprehensive metabolic panel FUTURE anytime; Future  - **A1C FUTURE anytime; Future          Patient instructions discussed with patient  Patient Instructions   Amoxicillin 875 mg twice a day for 10 days.      NON PRESCRIPTION TREATMENT UPPER RESPIRATORY INFECTION    Mucinex 600 mg 1 tablets once daily.   Avoid decongestant  Saline nasal spray as needed  Increase fluid intake  Maintain 8 hr minimum of sleep at night  Robitussin DM cough gels for cough  Return to clinic  if no improvement or worsening symptoms after 7-10 days    You may return to clinic at your earliest convenience for fasting labs.         The information in this document, created by the medical scribe for me, accurately reflects the services I personally performed and the decisions made by me. I have reviewed and approved this document for accuracy prior to leaving the patient care area.  September 11, 2018 1:22 PM      Whit Casiano MD  [facility]

## 2018-09-13 ENCOUNTER — TRANSFERRED RECORDS (OUTPATIENT)
Dept: HEALTH INFORMATION MANAGEMENT | Facility: CLINIC | Age: 83
End: 2018-09-13

## 2018-09-14 PROBLEM — I48.91 ATRIAL FIBRILLATION (H): Status: ACTIVE | Noted: 2018-09-14

## 2018-10-01 ENCOUNTER — TELEPHONE (OUTPATIENT)
Dept: FAMILY MEDICINE | Facility: CLINIC | Age: 83
End: 2018-10-01

## 2018-10-01 NOTE — TELEPHONE ENCOUNTER
Provider, patient wanted to notify you that he went to an allergist in [place] and then went to [facility] for a follow-up. He just wanted you to know what treatments they advised on and that at this time patient states that his rash is almost 90% cleared/resolved at this time. All treatment and Dermatology documentation can be found in the 9/27/18 OV encounter with Dr. Mcclendon from [facility].    Routing to provider to review and advise.     Corinna Tan RN

## 2018-10-01 NOTE — TELEPHONE ENCOUNTER
Reason for Call:  Other     Detailed comments: Shin rash just reporting back.     Phone Number Patient can be reached at: Home number on file [phone] (home)    Best Time: any    Can we leave a detailed message on this number? YES    Call taken on 10/1/2018 at 3:07 PM by Jelena Pineda

## 2018-10-28 DIAGNOSIS — L28.2 PRURITIC RASH: ICD-10-CM

## 2018-10-29 NOTE — TELEPHONE ENCOUNTER
"Requested Prescriptions   Pending Prescriptions Disp Refills     cetirizine (ZYRTEC) 10 MG tablet [Pharmacy Med Name: CETIRIZINE 10MG TABLETS] 60 tablet 0     Sig: TAKE 1 TABLET(10 MG) BY MOUTH TWICE DAILY    Antihistamines Protocol Failed    10/28/2018  3:24 AM       Failed - Patient is 3-64 years of age    Apply weight-based dosing for peds patients age 3 - 12 years of age.    Forward request to provider for patients under the age of 3 or over the age of 64.         Passed - Recent (12 mo) or future (30 days) visit within the authorizing provider's specialty    Patient had office visit in the last 12 months or has a visit in the next 30 days with authorizing provider or within the authorizing provider's specialty.  See \"Patient Info\" tab in inbasket, or \"Choose Columns\" in Meds & Orders section of the refill encounter.              cetirizine (ZYRTEC) 10 MG tablet  Last Written Prescription Date:  8/31/18  Last Fill Quantity: 60,  # refills: 1   Last office visit: 9/11/2018 with prescribing provider:  Dr. Casiano   Future Office Visit:      "

## 2018-10-30 RX ORDER — CETIRIZINE HYDROCHLORIDE 10 MG/1
TABLET ORAL
Qty: 60 TABLET | Refills: 0 | Status: SHIPPED | OUTPATIENT
Start: 2018-10-30

## 2018-10-30 NOTE — TELEPHONE ENCOUNTER
Routing refill request to provider for review/approval because:  Patient is over 64 years old so protocol failed.    Jeffery Roman RN, BSN

## 2019-06-10 ENCOUNTER — OFFICE VISIT (OUTPATIENT)
Dept: FAMILY MEDICINE | Facility: CLINIC | Age: 84
End: 2019-06-10
Payer: MEDICARE

## 2019-06-10 VITALS
DIASTOLIC BLOOD PRESSURE: 61 MMHG | TEMPERATURE: 97.7 F | BODY MASS INDEX: 27.17 KG/M2 | WEIGHT: 178.7 LBS | HEART RATE: 68 BPM | RESPIRATION RATE: 18 BRPM | SYSTOLIC BLOOD PRESSURE: 96 MMHG | OXYGEN SATURATION: 94 %

## 2019-06-10 DIAGNOSIS — J32.0 CHRONIC MAXILLARY SINUSITIS: Primary | ICD-10-CM

## 2019-06-10 PROCEDURE — 99213 OFFICE O/P EST LOW 20 MIN: CPT | Performed by: NURSE PRACTITIONER

## 2019-06-10 RX ORDER — FEXOFENADINE HCL 180 MG/1
180 TABLET ORAL DAILY
COMMUNITY

## 2019-06-10 RX ORDER — MIRABEGRON 50 MG/1
50 TABLET, EXTENDED RELEASE ORAL
COMMUNITY

## 2019-06-10 RX ORDER — FLUTICASONE PROPIONATE 50 MCG
1 SPRAY, SUSPENSION (ML) NASAL DAILY
Qty: 9.9 ML | Refills: 3 | Status: SHIPPED | OUTPATIENT
Start: 2019-06-10

## 2019-06-10 RX ORDER — MULTIVITAMIN WITH FOLIC ACID 400 MCG
1 TABLET ORAL
COMMUNITY
Start: 2010-10-08

## 2019-06-10 RX ORDER — SOTALOL HYDROCHLORIDE 80 MG/1
80 TABLET ORAL
COMMUNITY
End: 2019-06-10

## 2019-06-10 RX ORDER — MOMETASONE FUROATE 1 MG/G
OINTMENT TOPICAL
COMMUNITY

## 2019-06-10 RX ORDER — PHENOL 1.4 %
3 AEROSOL, SPRAY (ML) MUCOUS MEMBRANE
COMMUNITY
End: 2019-08-13 | Stop reason: DRUGHIGH

## 2019-06-10 RX ORDER — FLUTICASONE PROPIONATE 50 MCG
1 SPRAY, SUSPENSION (ML) NASAL DAILY
COMMUNITY
End: 2019-06-10

## 2019-06-10 ASSESSMENT — PAIN SCALES - GENERAL: PAINLEVEL: NO PAIN (0)

## 2019-06-10 NOTE — PROGRESS NOTES
Subjective     Teddy Horner is a 83 year old male who presents to clinic today for the following health issues:    HPI   Acute Illness   Acute illness concerns: Sinus problem  Onset: 3 weeks ago    Fever: no     Chills/Sweats: no     Headache (location?): no     Sinus Pressure:YES    Conjunctivitis:  YES: bilateral    Ear Pain: no    Rhinorrhea: YES, hard sometimes, soft sometimes, more yellow than green    Congestion: YES    Sore Throat: no      Cough: no    Wheeze: no    Decreased Appetite: no    Nausea: no    Vomiting: no    Diarrhea:  no    Dysuria/Freq.: no    Fatigue/Achiness: no    Sick/Strep Exposure: no     Therapies Tried and outcome: nothing    Lives in [place], just back here a few days ago. No pain/pressure with flight.  Appetite is okay. No sick contacts. No dizziness that's new, has some at baseline. Has ICD. Saw cardiology in [place] recently, normal no med changes.     Went to dermatology specialist for his itching. Things are improved now.     Patient Active Problem List   Diagnosis     Ocular migraine     Coronary artery disease involving coronary bypass graft of native heart without angina pectoris     History of TIA (transient ischemic attack) and stroke     Acquired hypothyroidism     Neuropathic pain, arm     Bilateral arm numbness and tingling while sleeping     DDD (degenerative disc disease), cervical     Advanced directives, counseling/discussion     Squamous blepharitis of upper and lower eyelids of both eyes     Atrial fibrillation (H)     Past Surgical History:   Procedure Laterality Date     AS CABG, VEIN, THREE  2012     CATARACT IOL, RT/LT Bilateral      HERNIORRHAPHY INGUINAL Left      LAPAROSCOPIC CHOLECYSTECTOMY         Social History     Tobacco Use     Smoking status: Never Smoker     Smokeless tobacco: Never Used   Substance Use Topics     Alcohol use: Yes     Family History   Problem Relation Age of Onset     Alcoholism Daughter          Current Outpatient Medications    Medication Sig Dispense Refill     amitriptyline (ELAVIL) 10 MG tablet Take 1 tablet (10 mg) by mouth At Bedtime 30 tablet 1     amLODIPine (NORVASC) 2.5 MG tablet Take 2.5 mg by mouth 2 times daily       amoxicillin-clavulanate (AUGMENTIN) 875-125 MG tablet Take 1 tablet by mouth 2 times daily for 10 days 20 tablet 0     aspirin 81 MG tablet Take 81 mg by mouth daily       atorvastatin (LIPITOR) 10 MG tablet Take 0.5 tablets (5 mg) by mouth daily 30 tablet 1     Calcium-Magnesium-Vitamin D (CALCIUM MAGNESIUM PO)        cetirizine (ZYRTEC) 10 MG tablet TAKE 1 TABLET(10 MG) BY MOUTH TWICE DAILY 60 tablet 0     clopidogrel (PLAVIX) 75 MG tablet Take 75 mg by mouth daily       fexofenadine (ALLEGRA) 180 MG tablet Take 180 mg by mouth daily       fluticasone (FLONASE) 50 MCG/ACT nasal spray Spray 1 spray into both nostrils daily 9.9 mL 3     levothyroxine (SYNTHROID/LEVOTHROID) 100 MCG tablet Take 1 tablet (100 mcg) by mouth daily 90 tablet 1     magnesium oxide 400 MG CAPS Take 400 mg by mouth 2 times daily       Melatonin 10 MG TABS tablet Take 10 mg by mouth nightly as needed for sleep       mirabegron (MYRBETRIQ) 50 MG 24 hr tablet Take 50 mg by mouth       mometasone (ELOCON) 0.1 % external ointment mometasone 0.1 % topical ointment       Multiple Vitamin (TAB-A-HORTENCIA) TABS Take 1 tablet by mouth       Multiple Vitamins-Minerals (CENTRUM SILVER) per tablet Take 1 tablet by mouth daily       Multiple Vitamins-Minerals (OCUVITE PO)        PANTOPRAZOLE SODIUM PO Take 40 mg by mouth every evening       sotalol (BETAPACE) 40 mg TABS half-tab Take 40 mg by mouth       sulindac (CLINORIL) 200 MG tablet Take 200 mg by mouth 2 times daily       tamsulosin (FLOMAX) 0.4 MG capsule Take 0.4 mg by mouth daily       Allergies   Allergen Reactions     Sulfa Drugs Hives         Reviewed and updated as needed this visit by Provider         Review of Systems   ROS COMP: Constitutional, HEENT-as above, cardiovascular, pulmonary, gi  and gu systems are negative, except as otherwise noted.      Objective    Wt 81.1 kg (178 lb 11.2 oz)   BMI 27.17 kg/m    Body mass index is 27.17 kg/m .  Physical Exam   GENERAL: healthy, alert and no distress  HEENT: bilateral TM and canals WNL, posterior pharynx slight erythema no exudate, slight maxillary sinus pressure  NECK: no adenopathy, no asymmetry, masses, or scars and thyroid normal to palpation  RESP: lungs clear to auscultation - no rales, rhonchi or wheezes  CV: regular rate and rhythm, normal S1 S2, no S3 or S4, no murmur, click or rub  MS: no gross musculoskeletal defects noted, no edema    Diagnostic Test Results:  Labs reviewed in Epic        Assessment & Plan     1. Chronic maxillary sinusitis  Trial flonase, start antibiotic. Take it a few hours away from multivitamin. Call if not improving  - fluticasone (FLONASE) 50 MCG/ACT nasal spray; Spray 1 spray into both nostrils daily  Dispense: 9.9 mL; Refill: 3  - amoxicillin-clavulanate (AUGMENTIN) 875-125 MG tablet; Take 1 tablet by mouth 2 times daily for 10 days  Dispense: 20 tablet; Refill: 0         Return in about 1 week (around 6/17/2019), or if symptoms worsen or fail to improve.    SLAVA Naylor, NP-C  [facility]

## 2019-07-23 ENCOUNTER — OFFICE VISIT (OUTPATIENT)
Dept: FAMILY MEDICINE | Facility: CLINIC | Age: 84
End: 2019-07-23
Payer: MEDICARE

## 2019-07-23 VITALS
OXYGEN SATURATION: 96 % | BODY MASS INDEX: 26.37 KG/M2 | HEIGHT: 68 IN | TEMPERATURE: 97.5 F | RESPIRATION RATE: 16 BRPM | WEIGHT: 174 LBS | DIASTOLIC BLOOD PRESSURE: 62 MMHG | SYSTOLIC BLOOD PRESSURE: 95 MMHG | HEART RATE: 78 BPM

## 2019-07-23 DIAGNOSIS — S81.811A SKIN TEAR OF RIGHT LOWER LEG WITHOUT COMPLICATION, INITIAL ENCOUNTER: ICD-10-CM

## 2019-07-23 DIAGNOSIS — S51.011A SKIN TEAR OF ELBOW WITHOUT COMPLICATION, RIGHT, INITIAL ENCOUNTER: Primary | ICD-10-CM

## 2019-07-23 PROCEDURE — 99214 OFFICE O/P EST MOD 30 MIN: CPT | Performed by: FAMILY MEDICINE

## 2019-07-23 ASSESSMENT — MIFFLIN-ST. JEOR: SCORE: 1458.76

## 2019-07-23 NOTE — PATIENT INSTRUCTIONS
Keep the areas cover with the plastic tape unless it starts to fall off.  Follow up in 1 week for recheck areas.    At [facility], we strive to deliver an exceptional experience to you, every time we see you.  If you receive a survey in the mail, please send us back your thoughts. We really do value your feedback.    Your care team:     Family Medicine   HEIKE Osuna MD Emily Bunt, APRN CNP S. MD Whit Ridley MD Angela Wermerskirchen, MD         Clinic hours: Monday - Wednesday 7 am-7 pm   Thursdays and Fridays 7 am-5 pm.     [facility]: Monday - Friday 11 am-9 pm,   Saturday and Sunday 9 am-5 pm.    [facility]: Monday -Thursday 8 am-8 pm; Friday 8 am-6 pm; Saturday and Sunday 9 am-5 pm.     [facility]: Monday - Thursday 8 am - 7 pm; Friday 8 am - 6 pm    Clinic: [phone]   [facility]: [phone]   [facility]: [phone]

## 2019-07-23 NOTE — PROGRESS NOTES
"Subjective     Teddy Horner is a 83 year old male who presents to clinic today for the following health issues:    HPI   Patient tripped on curb and fell this morning outside of home 630 am. He landed on his right arm and leg. Scrapes and bruises; currently still bleeding. Denies any possibility of fractures, but voices his right wrist is sore.   denies head injury      BP Readings from Last 3 Encounters:   07/23/19 95/62   06/10/19 96/61   09/11/18 120/74    Wt Readings from Last 3 Encounters:   07/24/19 78.9 kg (174 lb)   07/23/19 78.9 kg (174 lb)   06/10/19 81.1 kg (178 lb 11.2 oz)                      Reviewed and updated as needed this visit by Provider  Tobacco  Allergies  Meds  Med Hx  Surg Hx  Fam Hx  Soc Hx        Review of Systems   ROS COMP: Constitutional, HEENT, cardiovascular, pulmonary, gi and gu systems are negative, except as otherwise noted.      Objective    BP 95/62 (BP Location: Left arm)   Pulse 78   Temp 97.5  F (36.4  C) (Oral)   Resp 16   Ht 1.727 m (5' 8\")   Wt 78.9 kg (174 lb)   SpO2 96%   BMI 26.46 kg/m    Body mass index is 26.46 kg/m .  Physical Exam   GENERAL: healthy, alert and no distress  NECK: no adenopathy, no asymmetry, masses, or scars and thyroid normal to palpation  RESP: lungs clear to auscultation - no rales, rhonchi or wheezes  CV: regular rate and rhythm, normal S1 S2, no S3 or S4, no murmur, click or rub, no peripheral edema and peripheral pulses strong  MS: no gross musculoskeletal defects noted, no edema  SKIN: 3 large skin tears from fall.  Areas cleaned and inspected as below:  Right lateral elbow - large flap tear - re-approximated and tegaderm applied.  Right lateral tibial plateau - flap tear with portion of skin avulsed - available skin re-approximated and tegaderm applied.  Right anterior knee smaller skin tear with skin re-approximated and tegaderm applied.    Covered with Kerlex gauze.    Diagnostic Test Results:  Labs reviewed in Epic    " "    Assessment & Plan     1. Skin tear of elbow without complication, right, initial encounter  2. Skin tear of right lower leg without complication, initial encounter  Treated as above. Return to clinic 1 week.       BMI:   Estimated body mass index is 26.46 kg/m  as calculated from the following:    Height as of this encounter: 1.727 m (5' 8\").    Weight as of this encounter: 78.9 kg (174 lb).           Patient Instructions     Keep the areas cover with the plastic tape unless it starts to fall off.  Follow up in 1 week for recheck areas.        Return in about 1 week (around 7/30/2019).    Whit Casiano MD  [facility]      "

## 2019-07-24 ENCOUNTER — OFFICE VISIT (OUTPATIENT)
Dept: FAMILY MEDICINE | Facility: CLINIC | Age: 84
End: 2019-07-24
Payer: MEDICARE

## 2019-07-24 ENCOUNTER — TELEPHONE (OUTPATIENT)
Dept: FAMILY MEDICINE | Facility: CLINIC | Age: 84
End: 2019-07-24

## 2019-07-24 VITALS — BODY MASS INDEX: 26.37 KG/M2 | HEIGHT: 68 IN | WEIGHT: 174 LBS

## 2019-07-24 DIAGNOSIS — S50.311D ABRASION OF RIGHT ELBOW, SUBSEQUENT ENCOUNTER: Primary | ICD-10-CM

## 2019-07-24 DIAGNOSIS — S80.211D: ICD-10-CM

## 2019-07-24 PROCEDURE — 99213 OFFICE O/P EST LOW 20 MIN: CPT | Performed by: NURSE PRACTITIONER

## 2019-07-24 ASSESSMENT — MIFFLIN-ST. JEOR: SCORE: 1458.76

## 2019-07-24 ASSESSMENT — PAIN SCALES - GENERAL: PAINLEVEL: NO PAIN (0)

## 2019-07-24 NOTE — TELEPHONE ENCOUNTER
Patient seen today for OV with provider for:  Assessment & Plan         1. Abrasion of right elbow, subsequent encounter  Redressed right lateral elbow with Tegaderm, advised to return if it falls off or patient he could also use the Covan to rewrap with nonadherent gauze.     2. Abrasion, knee, right, subsequent encounter  As above but redressed the right lateral knee     Provider, patient states nothing has changed since OV and dressings are still intact. Patient states at OV, there were what appeared to be fluid-filled, small blisters on his right hip and knee at area of abrasion. Patient had forgotten to ask if this was okay to leave or if he should have them drained? At this time, all are intact, no changes or active drainage.     Routing to provider to review and advise.     Corinna Cline, ALVINON, RN, PHN

## 2019-07-24 NOTE — TELEPHONE ENCOUNTER
..Reason for call:  Patient reporting a symptom    Symptom or request: blister on his knee and hips     Duration (how long have symptoms been present): couple days ago    Have you been treated for this before? Yes    Additional comments: Patient called and stated that he has some blisters on his hips and knee from a fall  and his wondering if that is a fluid build up and if he should not worry about it. Please call for advice.        Phone Number patient can be reached at:  Cell number on file:    Telephone Information:   Mobile [phone]       Best Time:  any    Can we leave a detailed message on this number:  YES    Call taken on 7/24/2019 at 4:57 PM by An Suero

## 2019-07-24 NOTE — PROGRESS NOTES
"Subjective     Teddy Horner is a 83 year old male who presents to clinic today for the following health issues:    HPI   Tegaderm bandage came off.  Denies fever or chills.  Denies increased bleeding.  Just needs the bandages changed.    Patient Active Problem List   Diagnosis     Ocular migraine     Coronary artery disease involving coronary bypass graft of native heart without angina pectoris     History of TIA (transient ischemic attack) and stroke     Acquired hypothyroidism     Neuropathic pain, arm     Bilateral arm numbness and tingling while sleeping     DDD (degenerative disc disease), cervical     Advanced directives, counseling/discussion     Squamous blepharitis of upper and lower eyelids of both eyes     Atrial fibrillation (H)     Past Surgical History:   Procedure Laterality Date     AS CABG, VEIN, THREE  2012     CATARACT IOL, RT/LT Bilateral      HERNIORRHAPHY INGUINAL Left      LAPAROSCOPIC CHOLECYSTECTOMY         Social History     Tobacco Use     Smoking status: Never Smoker     Smokeless tobacco: Never Used   Substance Use Topics     Alcohol use: Yes     Family History   Problem Relation Age of Onset     Alcoholism Daughter            Reviewed and updated as needed this visit by Provider  Tobacco  Allergies  Meds  Problems  Med Hx  Surg Hx  Fam Hx         Review of Systems   ROS COMP: skin as above      Objective    Ht 1.727 m (5' 8\")   Wt 78.9 kg (174 lb)   BMI 26.46 kg/m    Body mass index is 26.46 kg/m .  Physical Exam   GENERAL: healthy, alert and no distress  SKIN: right lateral elbow with abrasion, open skin/scant bleeding, right lateral knee skinned abrasion in 2 areas    Diagnostic Test Results:  Labs reviewed in Epic        Assessment & Plan     1. Abrasion of right elbow, subsequent encounter  Redressed right lateral elbow with Tegaderm, advised to return if it falls off or patient he could also use the Covan to rewrap with nonadherent gauze.    2. Abrasion, knee, right, " "subsequent encounter  As above but redressed the right lateral knee       BMI:   Estimated body mass index is 26.46 kg/m  as calculated from the following:    Height as of this encounter: 1.727 m (5' 8\").    Weight as of this encounter: 78.9 kg (174 lb).       Return in about 1 week (around 7/31/2019) for Routine Visit.    SLAVA Naylor, NP-C  [facility]    This chart was documented by provider using a voice activated software called Dragon in addition to manual typing. There may be vocabulary errors or other grammatical errors due to this.       "

## 2019-07-25 NOTE — TELEPHONE ENCOUNTER
Monitor blisters for now. Did not see them on right knee during exam today, but did not exam right hip to see how big those are. He can place gauze over the blisters if they pop or are rubbing, otherwise if bleeding, signs of infection he is to return to clinic for further evaluations.   SLAVA Naylor, NP-C  [facility]

## 2019-07-25 NOTE — TELEPHONE ENCOUNTER
Spoke with Teddy to relay provider message. He states understanding and has no further questions.     Corinna Cline, BSN, RN, PHN

## 2019-07-27 ENCOUNTER — NURSE TRIAGE (OUTPATIENT)
Dept: NURSING | Facility: CLINIC | Age: 84
End: 2019-07-27

## 2019-07-27 NOTE — TELEPHONE ENCOUNTER
His wife will bring him to the ER.  Galilea Tesfaye RN-[facility]      Reason for Disposition    Skin is split open or gaping  (or length > 1/2 inch or 12 mm on the skin, 1/4 inch or 6 mm on the face)    Additional Information    Negative: [1] Major bleeding (e.g., actively dripping or spurting) AND [2] can't be stopped    Negative: Amputation    Negative: Shock suspected (e.g., cold/pale/clammy skin, too weak to stand, low BP, rapid pulse)    Negative: [1] Knife wound (or other possibly deep cut) AND [2] to chest, abdomen, back, neck, or head    Negative: [1] Cutter (self-mutilator) AND [2] suicidal or out-of-control    Negative: Sounds like a life-threatening emergency to the triager    Negative: [1] Animal bite AND [2] broken skin    Negative: [1] Human bite AND [2] broken skin    Negative: [1] Bleeding AND [2] won't stop after 10 minutes of direct pressure (using correct technique)    Protocols used: CUTS AND [id number]

## 2019-08-13 ENCOUNTER — ANCILLARY PROCEDURE (OUTPATIENT)
Dept: GENERAL RADIOLOGY | Facility: CLINIC | Age: 84
End: 2019-08-13
Attending: PHYSICIAN ASSISTANT
Payer: MEDICARE

## 2019-08-13 ENCOUNTER — OFFICE VISIT (OUTPATIENT)
Dept: FAMILY MEDICINE | Facility: CLINIC | Age: 84
End: 2019-08-13
Payer: MEDICARE

## 2019-08-13 VITALS
TEMPERATURE: 98.2 F | DIASTOLIC BLOOD PRESSURE: 58 MMHG | OXYGEN SATURATION: 97 % | SYSTOLIC BLOOD PRESSURE: 92 MMHG | HEART RATE: 66 BPM | HEIGHT: 68 IN | WEIGHT: 179 LBS | BODY MASS INDEX: 27.13 KG/M2 | RESPIRATION RATE: 19 BRPM

## 2019-08-13 DIAGNOSIS — R05.9 COUGH: ICD-10-CM

## 2019-08-13 DIAGNOSIS — R05.9 COUGH: Primary | ICD-10-CM

## 2019-08-13 PROCEDURE — 71046 X-RAY EXAM CHEST 2 VIEWS: CPT | Mod: FY

## 2019-08-13 PROCEDURE — 99213 OFFICE O/P EST LOW 20 MIN: CPT | Performed by: PHYSICIAN ASSISTANT

## 2019-08-13 RX ORDER — LANOLIN ALCOHOL/MO/W.PET/CERES
1 CREAM (GRAM) TOPICAL
COMMUNITY

## 2019-08-13 ASSESSMENT — MIFFLIN-ST. JEOR: SCORE: 1481.44

## 2019-08-13 ASSESSMENT — PAIN SCALES - GENERAL: PAINLEVEL: NO PAIN (0)

## 2019-08-13 NOTE — PROGRESS NOTES
"Subjective     Teddy Horner is a 83 year old male who presents to clinic today for the following health issues:    HPI   Acute Illness   Acute illness concerns: cough  Onset: 1 1/2 weeks    Fever: no    Chills/Sweats: no    Headache (location?): no    Sinus Pressure:no    Conjunctivitis:  no    Ear Pain: no    Rhinorrhea: YES    Congestion: YES    Sore Throat: no     Cough: YES-non-productive    Wheeze: no    Decreased Appetite: no    Nausea: no    Vomiting: no    Diarrhea:  no    Dysuria/Freq.: no    Fatigue/Achiness: YES    Sick/Strep Exposure: no     Therapies Tried and outcome: saline spray    Dry cough intermittently approximately once an hour.   No increase cough with lying Horizontal   No heartburn.   No improvement with saline nasal spray and/or flonase   \"Sinus infection all my life\"  No sinus pain or pressure right now.  No chest pain or shortness of breath.  He reports that he does not have atrial fibrillation.  Does have ICD in place  Can't sleep have to blow my nose on and off for many years  Leaving next week for 2nd home in [place]         Patient Active Problem List   Diagnosis     Ocular migraine     Coronary artery disease involving coronary bypass graft of native heart without angina pectoris     History of TIA (transient ischemic attack) and stroke     Acquired hypothyroidism     Neuropathic pain, arm     Bilateral arm numbness and tingling while sleeping     DDD (degenerative disc disease), cervical     Advanced directives, counseling/discussion     Squamous blepharitis of upper and lower eyelids of both eyes     Atrial fibrillation (H)     Past Surgical History:   Procedure Laterality Date     AS CABG, VEIN, THREE  2012     CATARACT IOL, RT/LT Bilateral      HERNIORRHAPHY INGUINAL Left      LAPAROSCOPIC CHOLECYSTECTOMY         Social History     Tobacco Use     Smoking status: Never Smoker     Smokeless tobacco: Never Used   Substance Use Topics     Alcohol use: Yes     Family History   Problem " Relation Age of Onset     Alcoholism Daughter          Current Outpatient Medications   Medication Sig Dispense Refill     amLODIPine (NORVASC) 2.5 MG tablet Take 2.5 mg by mouth 2 times daily       aspirin 81 MG tablet Take 81 mg by mouth daily       atorvastatin (LIPITOR) 10 MG tablet Take 0.5 tablets (5 mg) by mouth daily 30 tablet 1     Calcium-Magnesium-Vitamin D (CALCIUM MAGNESIUM PO)        cetirizine (ZYRTEC) 10 MG tablet TAKE 1 TABLET(10 MG) BY MOUTH TWICE DAILY 60 tablet 0     clopidogrel (PLAVIX) 75 MG tablet Take 75 mg by mouth daily       fexofenadine (ALLEGRA) 180 MG tablet Take 180 mg by mouth daily       fluticasone (FLONASE) 50 MCG/ACT nasal spray Spray 1 spray into both nostrils daily 9.9 mL 3     levothyroxine (SYNTHROID/LEVOTHROID) 100 MCG tablet Take 1 tablet (100 mcg) by mouth daily 90 tablet 1     melatonin 3 MG tablet Take 1 mg by mouth nightly as needed for sleep       mirabegron (MYRBETRIQ) 50 MG 24 hr tablet Take 50 mg by mouth       mometasone (ELOCON) 0.1 % external ointment mometasone 0.1 % topical ointment       Multiple Vitamin (TAB-A-HORTENCIA) TABS Take 1 tablet by mouth       Multiple Vitamins-Minerals (CENTRUM SILVER) per tablet Take 1 tablet by mouth daily       Multiple Vitamins-Minerals (OCUVITE PO)        PANTOPRAZOLE SODIUM PO Take 40 mg by mouth every evening       sotalol (BETAPACE) 40 mg TABS half-tab Take 40 mg by mouth       sulindac (CLINORIL) 200 MG tablet Take 200 mg by mouth 2 times daily       tamsulosin (FLOMAX) 0.4 MG capsule Take 0.4 mg by mouth daily       BP Readings from Last 3 Encounters:   08/13/19 92/58   07/23/19 95/62   06/10/19 96/61    Wt Readings from Last 3 Encounters:   08/13/19 81.2 kg (179 lb)   07/24/19 78.9 kg (174 lb)   07/23/19 78.9 kg (174 lb)                      Reviewed and updated as needed this visit by Provider  Tobacco  Allergies  Meds  Problems  Med Hx  Surg Hx  Fam Hx  Soc Hx          Review of Systems   ROS COMP: Constitutional,  "HEENT, cardiovascular, pulmonary, gi and gu systems are negative, except as otherwise noted.      Objective    BP 92/58 (BP Location: Right arm, Patient Position: Chair, Cuff Size: Adult Regular)   Pulse 66   Temp 98.2  F (36.8  C) (Oral)   Resp 19   Ht 1.727 m (5' 8\")   Wt 81.2 kg (179 lb)   SpO2 97%   BMI 27.22 kg/m    Body mass index is 27.22 kg/m .  Physical Exam   GENERAL: alert, no distress, frail and elderly  EYES: Eyes grossly normal to inspection, PERRL and conjunctivae and sclerae normal  HENT: ear canals and TM's normal, nose and mouth without ulcers or lesions  NECK: no adenopathy, no asymmetry, masses, or scars and thyroid normal to palpation  RESP: lungs clear to auscultation - no rales, rhonchi or wheezes  CV: regular rate and rhythm, normal S1 S2, no S3 or S4, no murmur, click or rub, no peripheral edema and peripheral pulses strong  MS: ambulatory with cane.  Doesn't require assistance to get up and off exam table.   No peripheral edema     Diagnostic Test Results:  CXR - negative        Assessment & Plan     1. Cough  Random dry cough.   No acute infiltrate or evidence of CHF . Cautiously reassured.  Warning signs and symptoms warranting urgent evaluation reviewed.   - XR Chest 2 Views; Future             Patient Instructions   I will notify you of official read by radiology tomorrow.   Return urgently if any change in symptoms like chest pain, increasing cough, fever, shortness of breath or other change in symptoms.   Follow up with Dr. Casiano in 2 weeks if no improvement in symptoms       Return in about 2 weeks (around 8/27/2019), or if symptoms worsen or fail to improve.    Lilibeth Coffey PA-C  [facility]      "

## 2019-08-13 NOTE — RESULT ENCOUNTER NOTE
Please call and advise that radiologist did not see anything concerning on chest xray - follow up with us as needed

## 2019-08-26 ENCOUNTER — WALK IN (OUTPATIENT)
Dept: URGENT CARE | Age: 84
End: 2019-08-26

## 2019-08-26 ENCOUNTER — IMAGING SERVICES (OUTPATIENT)
Dept: GENERAL RADIOLOGY | Age: 84
End: 2019-08-26
Attending: PHYSICIAN ASSISTANT

## 2019-08-26 DIAGNOSIS — J40 BRONCHITIS: ICD-10-CM

## 2019-08-26 DIAGNOSIS — J32.9 SINUSITIS, UNSPECIFIED CHRONICITY, UNSPECIFIED LOCATION: ICD-10-CM

## 2019-08-26 DIAGNOSIS — R05.9 COUGH: Primary | ICD-10-CM

## 2019-08-26 DIAGNOSIS — R05.9 COUGH: ICD-10-CM

## 2019-08-26 PROCEDURE — 71046 X-RAY EXAM CHEST 2 VIEWS: CPT | Performed by: RADIOLOGY

## 2019-08-26 PROCEDURE — 99214 OFFICE O/P EST MOD 30 MIN: CPT | Performed by: PHYSICIAN ASSISTANT

## 2019-08-26 RX ORDER — SOTALOL HYDROCHLORIDE 80 MG/1
80 TABLET ORAL 2 TIMES DAILY
COMMUNITY

## 2019-08-26 RX ORDER — FEXOFENADINE HCL 60 MG/1
60 TABLET, FILM COATED ORAL DAILY
COMMUNITY

## 2019-08-26 RX ORDER — DOXYCYCLINE 100 MG/1
100 TABLET ORAL 2 TIMES DAILY
Qty: 20 TABLET | Refills: 0 | Status: SHIPPED | OUTPATIENT
Start: 2019-08-26

## 2019-08-26 RX ORDER — LEVOTHYROXINE SODIUM 0.1 MG/1
100 TABLET ORAL DAILY
COMMUNITY

## 2019-08-26 RX ORDER — CLOPIDOGREL BISULFATE 75 MG/1
75 TABLET ORAL NIGHTLY
COMMUNITY

## 2019-08-26 RX ORDER — SULINDAC 200 MG/1
200 TABLET ORAL 2 TIMES DAILY
COMMUNITY

## 2019-08-26 RX ORDER — PANTOPRAZOLE SODIUM 40 MG/1
40 TABLET, DELAYED RELEASE ORAL DAILY
COMMUNITY

## 2019-08-26 RX ORDER — AMLODIPINE BESYLATE 2.5 MG/1
2.5 TABLET ORAL 2 TIMES DAILY
COMMUNITY

## 2019-08-26 SDOH — HEALTH STABILITY: MENTAL HEALTH: HOW MANY STANDARD DRINKS CONTAINING ALCOHOL DO YOU HAVE ON A TYPICAL DAY?: 1 OR 2

## 2019-08-26 SDOH — HEALTH STABILITY: MENTAL HEALTH: HOW OFTEN DO YOU HAVE 6 OR MORE DRINKS ON ONE OCCASION?: DAILY OR ALMOST DAILY

## 2019-08-26 ASSESSMENT — PAIN SCALES - GENERAL: PAINLEVEL: 0

## 2019-10-24 ENCOUNTER — DOCUMENTATION ONLY (OUTPATIENT)
Dept: FAMILY MEDICINE | Facility: CLINIC | Age: 84
End: 2019-10-24

## 2019-10-24 NOTE — PROGRESS NOTES
This patient has overdue labs. A letter was sent on 9/19/2019 and there has been no lab appointment made. If you still want these labs done, please have your care team contact the patient to make a lab appointment. Otherwise, please have the labs discontinued and close the encounter.    Thank you,  [facility]

## 2020-07-21 NOTE — MR AVS SNAPSHOT
After Visit Summary   9/11/2018    Teddy Horner    MRN: [id number]           Patient Information     Date Of Birth          1935        Visit Information        Provider Department      9/11/2018 8:40 AM Whit Casiano MD [facility]        Today's Diagnoses     Acute sinusitis with symptoms > 10 days    -  1    Acquired hypothyroidism        Hyperglycemia          Care Instructions    Amoxicillin 875 mg twice a day for 10 days.      NON PRESCRIPTION TREATMENT UPPER RESPIRATORY INFECTION    Mucinex 600 mg 1 tablets once daily.   Avoid decongestant  Saline nasal spray as needed  Increase fluid intake  Maintain 8 hr minimum of sleep at night  Robitussin DM cough gels for cough  Return to clinic  if no improvement or worsening symptoms after 7-10 days    You may return to clinic at your earliest convenience for fasting labs.             Follow-ups after your visit        Future tests that were ordered for you today     Open Future Orders        Priority Expected Expires Ordered    **TSH with free T4 reflex FUTURE anytime Routine 9/11/2018 9/11/2019 9/11/2018    **Comprehensive metabolic panel FUTURE anytime Routine 9/11/2018 9/11/2019 9/11/2018    **A1C FUTURE anytime Routine 9/11/2018 9/11/2019 9/11/2018            Who to contact     If you have questions or need follow up information about today's clinic visit or your schedule please contact [facility] directly at [phone].  Normal or non-critical lab and imaging results will be communicated to you by MyChart, letter or phone within 4 business days after the clinic has received the results. If you do not hear from us within 7 days, please contact the clinic through MyChart or phone. If you have a critical or abnormal lab result, we will notify you by phone as soon as possible.  Submit refill requests through [employer] or call your pharmacy and they will forward the refill request to us. Please allow 3 business  days for your refill to be completed.          Additional Information About Your Visit        Care EveryWhere ID     This is your Care EveryWhere ID. This could be used by other organizations to access your [place] medical records  [id number]        Your Vitals Were     Pulse Temperature Pulse Oximetry BMI (Body Mass Index)          80 97.6  F (36.4  C) (Oral) 99% 27.06 kg/m2         Blood Pressure from Last 3 Encounters:   09/11/18 120/74   07/17/18 96/58   07/12/18 126/80    Weight from Last 3 Encounters:   09/11/18 80.7 kg (178 lb)   07/17/18 81.6 kg (180 lb)   07/12/18 80.7 kg (178 lb)                 Today's Medication Changes          These changes are accurate as of 9/11/18  9:35 AM.  If you have any questions, ask your nurse or doctor.               Start taking these medicines.        Dose/Directions    amoxicillin 875 MG tablet   Commonly known as:  AMOXIL   Used for:  Acute sinusitis with symptoms > 10 days   Started by:  Whit Casiano MD        Dose:  875 mg   Take 1 tablet (875 mg) by mouth 2 times daily   Quantity:  20 tablet   Refills:  0            Where to get your medicines      These medications were sent to [facility] [address] [place] [id number]     Phone:  [phone]     amoxicillin 875 MG tablet                Primary Care Provider Office Phone # Fax #    Whit Casiano [phone] [address] [facility] [address]        Equal Access to Services     JUAN LUIS ROBLEDO AH: Hadii emely ku hadasho Sotof, waaxda luqadaha, qaybta kaalmada tennille tan. So [facility] [phone].    ATENCIÓN: Si habla español, tiene a knutson disposición servicios gratuitos de asistencia lingüística. Llame al [phone].    We comply with applicable federal civil rights laws and [place] laws. We do not discriminate on the basis of race, color, national origin, age,  disability, sex, sexual orientation, or gender identity.            Thank you!     Thank you for choosing [facility]  for your care. Our goal is always to provide you with excellent care. Hearing back from our patients is one way we can continue to improve our services. Please take a few minutes to complete the written survey that you may receive in the mail after your visit with us. Thank you!             Your Updated Medication List - Protect others around you: Learn how to safely use, store and throw away your medicines at www.disposemymeds.org.          This list is accurate as of 9/11/18  9:35 AM.  Always use your most recent med list.                   Brand Name Dispense Instructions for use Diagnosis    amitriptyline 10 MG tablet    ELAVIL    30 tablet    Take 1 tablet (10 mg) by mouth At Bedtime    Itching       amLODIPine 2.5 MG tablet    NORVASC     Take 2.5 mg by mouth 2 times daily        amoxicillin 875 MG tablet    AMOXIL    20 tablet    Take 1 tablet (875 mg) by mouth 2 times daily    Acute sinusitis with symptoms > 10 days       aspirin 81 MG tablet      Take 81 mg by mouth daily        atorvastatin 10 MG tablet    LIPITOR    30 tablet    Take 0.5 tablets (5 mg) by mouth daily        BETAPACE 80 MG tablet   Generic drug:  sotalol      Take 80 mg by mouth 2 times daily        * CENTRUM SILVER per tablet      Take 1 tablet by mouth daily        * OCUVITE PO           cetirizine 10 MG tablet    zyrTEC    60 tablet    Take 1 tablet (10 mg) by mouth 2 times daily    Pruritic rash       FLOMAX 0.4 MG capsule   Generic drug:  tamsulosin      Take 0.4 mg by mouth daily        gabapentin 100 MG capsule    NEURONTIN    30 capsule    Take 1 capsule (100 mg) by mouth At Bedtime    Bilateral arm numbness and tingling while sleeping, DDD (degenerative disc disease), cervical, Neuropathic pain, arm       levothyroxine 100 MCG tablet    SYNTHROID/LEVOTHROID    90 tablet    Take 1 tablet (100 mcg) by  mouth daily        magnesium oxide 400 MG Caps      Take 400 mg by mouth 2 times daily        PANTOPRAZOLE SODIUM PO      Take 40 mg by mouth every evening        PLAVIX 75 MG tablet   Generic drug:  clopidogrel      Take 75 mg by mouth daily        sulindac 200 MG tablet    CLINORIL     Take 200 mg by mouth 2 times daily        * Notice:  This list has 2 medication(s) that are the same as other medications prescribed for you. Read the directions carefully, and ask your doctor or other care provider to review them with you.       Yes

## 2021-05-26 VITALS
TEMPERATURE: 96 F | WEIGHT: 177.1 LBS | HEART RATE: 74 BPM | DIASTOLIC BLOOD PRESSURE: 60 MMHG | SYSTOLIC BLOOD PRESSURE: 114 MMHG | HEIGHT: 69 IN | OXYGEN SATURATION: 97 % | BODY MASS INDEX: 26.23 KG/M2 | RESPIRATION RATE: 20 BRPM

## 2023-01-01 NOTE — TELEPHONE ENCOUNTER
Dose appropriate.  Refill sent. PSK  
Reason for Call:  Medication or medication refill:    Do you use a [facility]?  Name of the pharmacy and phone number for the current request:  [facility] [address] [place] [address]    Name of the medication requested: Pending Prescriptions:                       Disp   Refills    cetirizine (ZYRTEC) 10 MG tablet          60 tab*1            Sig: Take 1 tablet (10 mg) by mouth 2 times daily    Other request: Please call when approved.    Can we leave a detailed message on this number? YES    Phone number patient can be reached at: Home number on file [phone] (home)    Best Time: any    Call taken on 8/31/2018 at 10:25 AM by Jelena Pineda      
Routing refill request to provider for review/approval because:  Warning for dose above the recommended dosing.    Charlene Greenberg RN, [facility]          
negative

## 2023-07-30 NOTE — TELEPHONE ENCOUNTER
Reason for Call:  Other prescription    Detailed comments: [id number] [address]  Take 1 tab qd hold for bp S <120 and or pulse < 55 BPM  2nd dose in pm for bp >160/100      Phone Number Spouse can be reached at: [phone]    Best Time: any    Can we leave a detailed message on this number? YES    Call taken on 7/13/2017 at 4:04 PM by Jelena Pineda         Adult

## 2024-06-17 PROBLEM — Z71.89 ADVANCED DIRECTIVES, COUNSELING/DISCUSSION: Status: RESOLVED | Noted: 2017-08-31 | Resolved: 2024-06-17
